# Patient Record
Sex: MALE | Race: WHITE | Employment: FULL TIME | URBAN - METROPOLITAN AREA
[De-identification: names, ages, dates, MRNs, and addresses within clinical notes are randomized per-mention and may not be internally consistent; named-entity substitution may affect disease eponyms.]

---

## 2023-04-07 ENCOUNTER — OFFICE VISIT (OUTPATIENT)
Dept: PRIMARY CARE | Facility: CLINIC | Age: 68
End: 2023-04-07
Payer: COMMERCIAL

## 2023-04-07 VITALS
DIASTOLIC BLOOD PRESSURE: 74 MMHG | WEIGHT: 203 LBS | SYSTOLIC BLOOD PRESSURE: 120 MMHG | TEMPERATURE: 97.2 F | BODY MASS INDEX: 28.31 KG/M2

## 2023-04-07 DIAGNOSIS — B36.9 FUNGAL INFECTION OF SKIN: Primary | ICD-10-CM

## 2023-04-07 PROBLEM — H40.059 BORDERLINE GLAUCOMA WITH OCULAR HYPERTENSION: Status: ACTIVE | Noted: 2023-04-07

## 2023-04-07 PROBLEM — M54.30 SCIATICA: Status: ACTIVE | Noted: 2023-04-07

## 2023-04-07 PROBLEM — H52.223 MYOPIA OF BOTH EYES WITH REGULAR ASTIGMATISM: Status: ACTIVE | Noted: 2023-04-07

## 2023-04-07 PROBLEM — D72.819 LEUKOPENIA: Status: ACTIVE | Noted: 2023-04-07

## 2023-04-07 PROBLEM — Z04.9 CONDITION NOT FOUND: Status: ACTIVE | Noted: 2023-04-07

## 2023-04-07 PROBLEM — H25.13 CATARACT, NUCLEAR SCLEROTIC, BOTH EYES: Status: ACTIVE | Noted: 2023-04-07

## 2023-04-07 PROBLEM — H40.009 BORDERLINE GLAUCOMA: Status: ACTIVE | Noted: 2023-04-07

## 2023-04-07 PROBLEM — H52.13 BILATERAL MYOPIA: Status: ACTIVE | Noted: 2023-04-07

## 2023-04-07 PROBLEM — M54.40 LOW BACK PAIN WITH SCIATICA: Status: ACTIVE | Noted: 2023-04-07

## 2023-04-07 PROBLEM — K63.5 COLON POLYPS: Status: ACTIVE | Noted: 2023-04-07

## 2023-04-07 PROBLEM — R97.20 INCREASED PROSTATE SPECIFIC ANTIGEN (PSA) VELOCITY: Status: ACTIVE | Noted: 2023-04-07

## 2023-04-07 PROBLEM — H52.4 MYOPIA WITH PRESBYOPIA OF BOTH EYES: Status: ACTIVE | Noted: 2023-04-07

## 2023-04-07 PROBLEM — R97.20 ELEVATED PSA: Status: ACTIVE | Noted: 2023-04-07

## 2023-04-07 PROBLEM — K64.8 BLEEDING INTERNAL HEMORRHOIDS: Status: ACTIVE | Noted: 2023-04-07

## 2023-04-07 PROBLEM — D64.9 ANEMIA: Status: ACTIVE | Noted: 2023-04-07

## 2023-04-07 PROBLEM — R60.0 EDEMA OF LEFT LOWER EXTREMITY: Status: ACTIVE | Noted: 2023-04-07

## 2023-04-07 PROBLEM — H40.013 OPEN ANGLE WITH BORDERLINE FINDINGS AND LOW GLAUCOMA RISK IN BOTH EYES: Status: ACTIVE | Noted: 2023-04-07

## 2023-04-07 PROBLEM — H52.13 MYOPIA OF BOTH EYES WITH REGULAR ASTIGMATISM: Status: ACTIVE | Noted: 2023-04-07

## 2023-04-07 PROBLEM — H52.13 MYOPIA WITH PRESBYOPIA OF BOTH EYES: Status: ACTIVE | Noted: 2023-04-07

## 2023-04-07 PROCEDURE — 1036F TOBACCO NON-USER: CPT | Performed by: INTERNAL MEDICINE

## 2023-04-07 PROCEDURE — 99213 OFFICE O/P EST LOW 20 MIN: CPT | Performed by: INTERNAL MEDICINE

## 2023-04-07 PROCEDURE — 1159F MED LIST DOCD IN RCRD: CPT | Performed by: INTERNAL MEDICINE

## 2023-04-07 RX ORDER — CLOTRIMAZOLE 1 %
CREAM (GRAM) TOPICAL 2 TIMES DAILY
Qty: 30 G | Refills: 0 | Status: SHIPPED | OUTPATIENT
Start: 2023-04-07 | End: 2023-05-07

## 2023-04-07 RX ORDER — TIMOLOL MALEATE 5 MG/ML
1 SOLUTION/ DROPS OPHTHALMIC DAILY
COMMUNITY
Start: 2022-02-14

## 2023-04-07 RX ORDER — LATANOPROST 50 UG/ML
1 SOLUTION/ DROPS OPHTHALMIC NIGHTLY
COMMUNITY
Start: 2021-12-30 | End: 2024-03-11

## 2023-04-07 ASSESSMENT — ENCOUNTER SYMPTOMS
MUSCULOSKELETAL NEGATIVE: 1
CARDIOVASCULAR NEGATIVE: 1
NEUROLOGICAL NEGATIVE: 1
PSYCHIATRIC NEGATIVE: 1
HEMATOLOGIC/LYMPHATIC NEGATIVE: 1
ALLERGIC/IMMUNOLOGIC NEGATIVE: 1
RESPIRATORY NEGATIVE: 1
CONSTITUTIONAL NEGATIVE: 1
ENDOCRINE NEGATIVE: 1
GASTROINTESTINAL NEGATIVE: 1
EYES NEGATIVE: 1

## 2023-04-07 ASSESSMENT — VISUAL ACUITY: OU: 1

## 2023-04-07 NOTE — PROGRESS NOTES
Subjective   Patient ID: [id number]   [facility]    Antonio Collins is a 67 y.o. male who presents for Rash (On groin).  Having itching over the groin for few weeks, specially at night.      Objective   Visit Vitals  /74 (BP Location: Left arm, Patient Position: Sitting, BP Cuff Size: Adult)   Temp 36.2 °C (97.2 °F) (Skin)      Review of Systems   Constitutional: Negative.    HENT: Negative.     Eyes: Negative.    Respiratory: Negative.     Cardiovascular: Negative.    Gastrointestinal: Negative.    Endocrine: Negative.    Genitourinary: Negative.    Musculoskeletal: Negative.    Skin:  Positive for rash.        And itching over the groin area.   Allergic/Immunologic: Negative.    Neurological: Negative.    Hematological: Negative.    Psychiatric/Behavioral: Negative.       Physical Exam  Constitutional:       Appearance: Normal appearance. He is well-developed and normal weight.   HENT:      Head: Normocephalic and atraumatic.      Right Ear: Tympanic membrane and ear canal normal.      Left Ear: Tympanic membrane and ear canal normal.      Mouth/Throat:      Mouth: Mucous membranes are moist.      Pharynx: Oropharynx is clear.   Eyes:      General: Lids are normal. Lids are everted, no foreign bodies appreciated. Vision grossly intact.      Extraocular Movements: Extraocular movements intact.      Pupils: Pupils are equal, round, and reactive to light.   Neck:      Thyroid: No thyroid mass or thyroid tenderness.      Trachea: Trachea and phonation normal.   Cardiovascular:      Rate and Rhythm: Normal rate and regular rhythm.      Pulses:           Carotid pulses are 1+ on the right side and 1+ on the left side.       Radial pulses are 1+ on the right side and 1+ on the left side.        Femoral pulses are 1+ on the right side and 1+ on the left side.       Popliteal pulses are 1+ on the right side.      Heart sounds: Normal heart sounds, S1 normal and S2 normal.      No S3 sounds.   Pulmonary:      Effort:  Pulmonary effort is normal.      Breath sounds: Normal breath sounds and air entry.   Abdominal:      General: Abdomen is flat. Bowel sounds are normal. There is no distension.      Palpations: Abdomen is soft. There is no mass.      Tenderness: There is no abdominal tenderness.   Musculoskeletal:         General: No swelling. Normal range of motion.      Cervical back: Normal range of motion and neck supple. No edema.      Right lower leg: No edema.   Lymphadenopathy:      Cervical: No cervical adenopathy.      Right cervical: No superficial cervical adenopathy.     Left cervical: No superficial cervical adenopathy.   Skin:     General: Skin is warm and dry.      Comments: Redness  over the groin area, no oozing.   Neurological:      General: No focal deficit present.      Mental Status: He is alert. Mental status is at baseline.   Psychiatric:         Attention and Perception: Attention normal.         Mood and Affect: Mood normal.         Behavior: Behavior is cooperative.         Assessment/Plan   Problem List Items Addressed This Visit    None  Visit Diagnoses       Fungal infection of skin    -  Primary    Relevant Medications    clotrimazole (Lotrimin) 1 % cream            Nick Escobedo MD

## 2023-05-12 LAB
PROSTATE SPECIFIC AG (NG/ML) IN SER/PLAS: 2.1 NG/ML (ref 0–4)
PROSTATE SPECIFIC AG FREE (NG/ML) IN SER/PLAS: 0.4 NG/ML
PROSTATE SPECIFIC AG FREE/PROSTATE SPECIFIC AG TOTAL IN SER/PLAS: 19 %

## 2023-11-13 ENCOUNTER — APPOINTMENT (OUTPATIENT)
Dept: PRIMARY CARE | Facility: CLINIC | Age: 68
End: 2023-11-13
Payer: COMMERCIAL

## 2023-11-21 ENCOUNTER — OFFICE VISIT (OUTPATIENT)
Dept: PRIMARY CARE | Facility: CLINIC | Age: 68
End: 2023-11-21
Payer: COMMERCIAL

## 2023-11-21 DIAGNOSIS — Z71.9 ENCOUNTER FOR COUNSELING: Primary | ICD-10-CM

## 2023-11-21 DIAGNOSIS — Z00.00 WELLNESS EXAMINATION: ICD-10-CM

## 2023-11-21 PROBLEM — E55.9 VITAMIN D DEFICIENCY: Status: ACTIVE | Noted: 2023-11-21

## 2023-11-21 PROBLEM — M72.0 DUPUYTREN'S CONTRACTURE OF BOTH HANDS: Status: ACTIVE | Noted: 2023-11-21

## 2023-11-21 PROBLEM — L85.3 XEROSIS CUTIS: Status: ACTIVE | Noted: 2023-05-16

## 2023-11-21 PROBLEM — L57.0 ACTINIC KERATOSIS: Status: ACTIVE | Noted: 2023-05-16

## 2023-11-21 PROCEDURE — 1036F TOBACCO NON-USER: CPT | Performed by: INTERNAL MEDICINE

## 2023-11-21 PROCEDURE — 1126F AMNT PAIN NOTED NONE PRSNT: CPT | Performed by: INTERNAL MEDICINE

## 2023-11-21 PROCEDURE — 1159F MED LIST DOCD IN RCRD: CPT | Performed by: INTERNAL MEDICINE

## 2023-11-21 PROCEDURE — UHSMG PR UH SELECT MEET AND GREET: Performed by: INTERNAL MEDICINE

## 2023-11-21 NOTE — PROGRESS NOTES
Patient presents for meet and teja    Born in [place]  Graduate of [employer] for undergrad and [employer] for law school  , previously with [employer].  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    Fly Loera MD

## 2023-11-22 ENCOUNTER — APPOINTMENT (OUTPATIENT)
Dept: PRIMARY CARE | Facility: CLINIC | Age: 68
End: 2023-11-22
Payer: COMMERCIAL

## 2023-11-27 ENCOUNTER — LAB (OUTPATIENT)
Dept: LAB | Facility: LAB | Age: 68
End: 2023-11-27
Payer: COMMERCIAL

## 2023-11-27 DIAGNOSIS — Z00.00 WELLNESS EXAMINATION: ICD-10-CM

## 2023-11-27 LAB
25(OH)D3 SERPL-MCNC: 35 NG/ML (ref 30–100)
ALBUMIN SERPL BCP-MCNC: 4.1 G/DL (ref 3.4–5)
ALP SERPL-CCNC: 57 U/L (ref 33–136)
ALT SERPL W P-5'-P-CCNC: 22 U/L (ref 10–52)
ANION GAP SERPL CALC-SCNC: 9 MMOL/L (ref 10–20)
APPEARANCE UR: ABNORMAL
AST SERPL W P-5'-P-CCNC: 40 U/L (ref 9–39)
BASOPHILS # BLD AUTO: 0.05 X10*3/UL (ref 0–0.1)
BASOPHILS NFR BLD AUTO: 1 %
BILIRUB SERPL-MCNC: 0.6 MG/DL (ref 0–1.2)
BILIRUB UR STRIP.AUTO-MCNC: NEGATIVE MG/DL
BUN SERPL-MCNC: 16 MG/DL (ref 6–23)
CALCIUM SERPL-MCNC: 8.5 MG/DL (ref 8.6–10.3)
CAOX CRY #/AREA UR COMP ASSIST: ABNORMAL /HPF
CHLORIDE SERPL-SCNC: 105 MMOL/L (ref 98–107)
CHOLEST SERPL-MCNC: 207 MG/DL (ref 0–199)
CHOLESTEROL/HDL RATIO: 3
CO2 SERPL-SCNC: 28 MMOL/L (ref 21–32)
COLOR UR: ABNORMAL
CREAT SERPL-MCNC: 0.79 MG/DL (ref 0.5–1.3)
CRP SERPL HS-MCNC: 0.8 MG/L
EOSINOPHIL # BLD AUTO: 0.32 X10*3/UL (ref 0–0.7)
EOSINOPHIL NFR BLD AUTO: 6.2 %
ERYTHROCYTE [DISTWIDTH] IN BLOOD BY AUTOMATED COUNT: 13.2 % (ref 11.5–14.5)
GFR SERPL CREATININE-BSD FRML MDRD: >90 ML/MIN/1.73M*2
GLUCOSE SERPL-MCNC: 100 MG/DL (ref 74–99)
GLUCOSE UR STRIP.AUTO-MCNC: NEGATIVE MG/DL
HCT VFR BLD AUTO: 39.7 % (ref 41–52)
HDLC SERPL-MCNC: 68.2 MG/DL
HGB BLD-MCNC: 13 G/DL (ref 13.5–17.5)
IMM GRANULOCYTES # BLD AUTO: 0.01 X10*3/UL (ref 0–0.7)
IMM GRANULOCYTES NFR BLD AUTO: 0.2 % (ref 0–0.9)
KETONES UR STRIP.AUTO-MCNC: NEGATIVE MG/DL
LDLC SERPL CALC-MCNC: 125 MG/DL
LEUKOCYTE ESTERASE UR QL STRIP.AUTO: NEGATIVE
LYMPHOCYTES # BLD AUTO: 1.59 X10*3/UL (ref 1.2–4.8)
LYMPHOCYTES NFR BLD AUTO: 30.6 %
MCH RBC QN AUTO: 33 PG (ref 26–34)
MCHC RBC AUTO-ENTMCNC: 32.7 G/DL (ref 32–36)
MCV RBC AUTO: 101 FL (ref 80–100)
MONOCYTES # BLD AUTO: 0.57 X10*3/UL (ref 0.1–1)
MONOCYTES NFR BLD AUTO: 11 %
MUCOUS THREADS #/AREA URNS AUTO: ABNORMAL /LPF
NEUTROPHILS # BLD AUTO: 2.65 X10*3/UL (ref 1.2–7.7)
NEUTROPHILS NFR BLD AUTO: 51 %
NITRITE UR QL STRIP.AUTO: NEGATIVE
NON HDL CHOLESTEROL: 139 MG/DL (ref 0–149)
NRBC BLD-RTO: 0 /100 WBCS (ref 0–0)
PH UR STRIP.AUTO: 5 [PH]
PLATELET # BLD AUTO: 217 X10*3/UL (ref 150–450)
POTASSIUM SERPL-SCNC: 4.1 MMOL/L (ref 3.5–5.3)
PROT SERPL-MCNC: 6.5 G/DL (ref 6.4–8.2)
PROT UR STRIP.AUTO-MCNC: NEGATIVE MG/DL
PSA SERPL-MCNC: 3.2 NG/ML
RBC # BLD AUTO: 3.94 X10*6/UL (ref 4.5–5.9)
RBC # UR STRIP.AUTO: ABNORMAL /UL
RBC #/AREA URNS AUTO: >20 /HPF
SODIUM SERPL-SCNC: 138 MMOL/L (ref 136–145)
SP GR UR STRIP.AUTO: 1.03
TRIGL SERPL-MCNC: 70 MG/DL (ref 0–149)
TSH SERPL-ACNC: 1.37 MIU/L (ref 0.44–3.98)
UROBILINOGEN UR STRIP.AUTO-MCNC: 2 MG/DL
VLDL: 14 MG/DL (ref 0–40)
WBC # BLD AUTO: 5.2 X10*3/UL (ref 4.4–11.3)
WBC #/AREA URNS AUTO: ABNORMAL /HPF

## 2023-11-27 PROCEDURE — 36415 COLL VENOUS BLD VENIPUNCTURE: CPT

## 2023-11-27 PROCEDURE — 80053 COMPREHEN METABOLIC PANEL: CPT

## 2023-11-27 PROCEDURE — 86141 C-REACTIVE PROTEIN HS: CPT

## 2023-11-27 PROCEDURE — 84443 ASSAY THYROID STIM HORMONE: CPT

## 2023-11-27 PROCEDURE — 85025 COMPLETE CBC W/AUTO DIFF WBC: CPT

## 2023-11-27 PROCEDURE — 83036 HEMOGLOBIN GLYCOSYLATED A1C: CPT

## 2023-11-27 PROCEDURE — 84153 ASSAY OF PSA TOTAL: CPT

## 2023-11-27 PROCEDURE — 80061 LIPID PANEL: CPT

## 2023-11-27 PROCEDURE — 81001 URINALYSIS AUTO W/SCOPE: CPT

## 2023-11-27 PROCEDURE — 82306 VITAMIN D 25 HYDROXY: CPT

## 2023-11-28 LAB
EST. AVERAGE GLUCOSE BLD GHB EST-MCNC: 120 MG/DL
HBA1C MFR BLD: 5.8 %

## 2023-12-04 ENCOUNTER — TELEMEDICINE (OUTPATIENT)
Dept: PRIMARY CARE | Facility: CLINIC | Age: 68
End: 2023-12-04
Payer: COMMERCIAL

## 2023-12-04 ENCOUNTER — TELEPHONE (OUTPATIENT)
Dept: PRIMARY CARE | Facility: CLINIC | Age: 68
End: 2023-12-04

## 2023-12-04 DIAGNOSIS — R73.01 IMPAIRED FASTING GLUCOSE: ICD-10-CM

## 2023-12-04 DIAGNOSIS — R31.29 MICROSCOPIC HEMATURIA: ICD-10-CM

## 2023-12-04 DIAGNOSIS — Z91.89 AT INCREASED RISK FOR CARDIOVASCULAR DISEASE: ICD-10-CM

## 2023-12-04 DIAGNOSIS — D53.9 MACROCYTIC ANEMIA: ICD-10-CM

## 2023-12-04 DIAGNOSIS — U07.1 COVID-19 VIRUS INFECTION: Primary | ICD-10-CM

## 2023-12-04 DIAGNOSIS — R97.20 ELEVATED PSA: ICD-10-CM

## 2023-12-04 PROBLEM — D72.819 LEUKOPENIA: Status: RESOLVED | Noted: 2023-04-07 | Resolved: 2023-12-04

## 2023-12-04 PROCEDURE — 99423 OL DIG E/M SVC 21+ MIN: CPT | Performed by: INTERNAL MEDICINE

## 2023-12-04 ASSESSMENT — ENCOUNTER SYMPTOMS
COUGH: 1
FEVER: 0
DIZZINESS: 0
BACK PAIN: 0
FATIGUE: 1
SHORTNESS OF BREATH: 0
HEADACHES: 0
SORE THROAT: 0
CHILLS: 1

## 2023-12-04 NOTE — PROGRESS NOTES
Subjective   Patient ID: Antonio Collins is a 68 y.o. male who presents for Covid-19 Home Monitoring Phone Call and Results.    Patient presents today for virtual visit due to COVID-19 virus infection  Patient consents to virtual visit.  He is currently located in [place].  I am currently located in [place].    COVID-19 virus infection  Symptom onset on 12/3.  Symptoms have included nasal congestion, chills, mild cough.  No fever.  No shortness of breath.  Patient has not taken any treatments.  He has been vaccinated.  He is actually feeling better currently.  Self-isolation guidelines reviewed    Intermediate cardiovascular risk (10-year cardiovascular risk = 10.6%)  Lipid panel reviewed.  10-year cardiovascular risk calculated at 10.6%.  Lifestyle measures reviewed including diet.  Patient maintains fairly well-balanced diet with limited intake of eggs, dairy, and red meat.  He generally exercises 3-4 times a week.  He denies any current cardiovascular symptoms.  Additional risk stratification using CT calcium scoring discussed which patient would like to pursue.    Macrocytic anemia  Current CBC with stable red blood cell count and MCV.  1/2023, CBC at similar level.  At that time, B12/folic acid/iron stores tested and normal.  Results reviewed with patient.  Plan to repeat labs in 6 months    Impaired fasting glucose (high normal glucose at 100, mildly elevated hemoglobin A1c at 5.8%)  No previous identification of elevated blood sugar or hemoglobin A1c.  Lifestyle measures reviewed and patient is keeping active and follows well-balanced diet.  Weight 199 pounds, height 6 feet 0 inches.    Microscopic hematuria  Patient denies any gross hematuria, dysuria, flank pain, abdominal pain, nausea or vomiting.  No history of kidney stone/nephrolithiasis  No previous evaluation for this condition.  He sees Dr. Pan as his urologist    Elevated PSA  MRI of prostate normal.  Negative prostate biopsy 5/2023.  Patient is  scheduled to see Dr. Pan in follow-up (to be delayed due to COVID-19)            Review of Systems   Constitutional:  Positive for chills and fatigue. Negative for fever.   HENT:  Positive for congestion. Negative for sore throat.    Respiratory:  Positive for cough. Negative for shortness of breath.    Cardiovascular:  Negative for chest pain.   Musculoskeletal:  Negative for back pain.   Neurological:  Negative for dizziness and headaches.       Objective   There were no vitals taken for this visit.    Physical Exam  Constitutional:       Appearance: Normal appearance.   HENT:      Mouth/Throat:      Mouth: Mucous membranes are moist.   Cardiovascular:      Comments: Patient is speaking in full sentences, appears comfortable and not in distress  Neurological:      Mental Status: He is alert.         Assessment/Plan     COVID-19 virus infection  Symptomatic treatment recommended:  Robitussin DM or Mucinex DM twice a day as needed for cough  Adequate fluid intake  Over-the-counter saline nasal spray (2 sprays in each nostril every 2-3 hours as needed/desired) and/or Flonase nasal spray (2 sprays in each nostril at bedtime)  CDC self-isolation guidelines discussed  Patient contact office if any worsening or unresolved symptoms    Intermediate cardiovascular risk (10-year cardiovascular risk = 10.6%)  Lifestyle measures including regular exercise and low-fat/cholesterol, high-fiber diet recommended  CT calcium scoring ordered for additional restratification    Macrocytic anemia (stable compared to 1/2023, normal B12-iron stores-folic acid at that time)  Monitor expectantly, repeat CBC in May    Impaired fasting glucose (high normal glucose at 100, mildly elevated hemoglobin A1c at 5.8%)  Lifestyle measures including low-carb/sugar diet, regular aerobic exercise, and modest weight loss of 5-10 pounds recommended  Repeat BMP and hemoglobin A1c in May    Microscopic hematuria  Referral to urology, Dr. Pan, for  evaluation    Elevated PSA (with negative prostate biopsy 5/2023)  Continue follow-up with urology, Dr. Pan    Follow-up  Wellness exam/physical in 1 to 2 months    (Virtual visit time = 33 minutes)    Fly Loera MD

## 2023-12-04 NOTE — TELEPHONE ENCOUNTER
Patient tested positive for covid 19 last night.  He would like to discuss abnormal labs.  Should I change his appointment to a virtual visit? Should I reschedule his CPE?  What do you recommend?

## 2023-12-04 NOTE — Clinical Note
Sal, I have recently assume primary care for our mutual patient, Antonio. Unfortunately, he has COVID-19 and had to cancel his in-person wellness exam/physical with me today, so we completed virtual visit. He will reschedule his appointment for later this week with you regarding his elevated PSA. He also has microscopic hematuria (greater than 20 RBC per HPF) without symptoms.  I have recommended that he review with you for your recommendations regarding evaluation. Thank you and Happy Holidays! Gokul

## 2023-12-04 NOTE — PATIENT INSTRUCTIONS
COVID-19 virus infection  Symptomatic treatment recommended:  Robitussin DM or Mucinex DM twice a day as needed for cough  Adequate fluid intake  Over-the-counter saline nasal spray (2 sprays in each nostril every 2-3 hours as needed/desired) and/or Flonase nasal spray (2 sprays in each nostril at bedtime)  CDC self-isolation guidelines discussed  Patient contact office if any worsening or unresolved symptoms    Intermediate cardiovascular risk (10-year cardiovascular risk = 10.6%)  Lifestyle measures including regular exercise and low-fat/cholesterol, high-fiber diet recommended  CT calcium scoring ordered for additional restratification    Macrocytic anemia (stable compared to 1/2023, normal B12-iron stores-folic acid at that time)  Monitor expectantly, repeat CBC in May    Impaired fasting glucose (high normal glucose at 100, mildly elevated hemoglobin A1c at 5.8%)  Lifestyle measures including low-carb/sugar diet, regular aerobic exercise, and modest weight loss of 5-10 pounds recommended  Repeat BMP and hemoglobin A1c in May    Microscopic hematuria  Referral to urology, Dr. Pan, for evaluation    Elevated PSA (with negative prostate biopsy 5/2023)  Continue follow-up with urology, Dr. Pan    Follow-up  Wellness exam/physical in 1 to 2 months

## 2023-12-05 ENCOUNTER — TELEPHONE (OUTPATIENT)
Dept: UROLOGY | Facility: HOSPITAL | Age: 68
End: 2023-12-05
Payer: COMMERCIAL

## 2023-12-05 DIAGNOSIS — R31.29 OTHER MICROSCOPIC HEMATURIA: ICD-10-CM

## 2023-12-05 NOTE — TELEPHONE ENCOUNTER
Returned patient's call and let him know that Dr. Pan wants a CT Urogram, urine culture, and urine cytology prior to his appointment. Left number for scheduling CT U. [employer] message will be sent.    Adali Branham RN

## 2023-12-07 ENCOUNTER — APPOINTMENT (OUTPATIENT)
Dept: UROLOGY | Facility: HOSPITAL | Age: 68
End: 2023-12-07
Payer: COMMERCIAL

## 2024-01-02 DIAGNOSIS — R31.29 MICROSCOPIC HEMATURIA: ICD-10-CM

## 2024-01-04 ENCOUNTER — LAB (OUTPATIENT)
Dept: LAB | Facility: LAB | Age: 69
End: 2024-01-04
Payer: MEDICARE

## 2024-01-04 DIAGNOSIS — R31.29 MICROSCOPIC HEMATURIA: ICD-10-CM

## 2024-01-04 DIAGNOSIS — R31.29 OTHER MICROSCOPIC HEMATURIA: ICD-10-CM

## 2024-01-04 PROCEDURE — 88112 CYTOPATH CELL ENHANCE TECH: CPT | Performed by: PATHOLOGY

## 2024-01-04 PROCEDURE — 88112 CYTOPATH CELL ENHANCE TECH: CPT

## 2024-01-04 PROCEDURE — 87086 URINE CULTURE/COLONY COUNT: CPT

## 2024-01-05 ENCOUNTER — ANCILLARY PROCEDURE (OUTPATIENT)
Dept: RADIOLOGY | Facility: CLINIC | Age: 69
End: 2024-01-05
Payer: COMMERCIAL

## 2024-01-05 DIAGNOSIS — R31.29 OTHER MICROSCOPIC HEMATURIA: ICD-10-CM

## 2024-01-05 PROCEDURE — 2550000001 HC RX 255 CONTRASTS: Performed by: UROLOGY

## 2024-01-05 PROCEDURE — 76377 3D RENDER W/INTRP POSTPROCES: CPT

## 2024-01-05 PROCEDURE — 74178 CT ABD&PLV WO CNTR FLWD CNTR: CPT | Performed by: STUDENT IN AN ORGANIZED HEALTH CARE EDUCATION/TRAINING PROGRAM

## 2024-01-05 PROCEDURE — 76377 3D RENDER W/INTRP POSTPROCES: CPT | Performed by: STUDENT IN AN ORGANIZED HEALTH CARE EDUCATION/TRAINING PROGRAM

## 2024-01-05 RX ADMIN — IOHEXOL 67 ML: 350 INJECTION, SOLUTION INTRAVENOUS at 08:53

## 2024-01-06 LAB — BACTERIA UR CULT: NO GROWTH

## 2024-01-08 LAB
LABORATORY COMMENT REPORT: NORMAL
LABORATORY COMMENT REPORT: NORMAL
PATH REPORT.FINAL DX SPEC: NORMAL
PATH REPORT.GROSS SPEC: NORMAL
PATH REPORT.RELEVANT HX SPEC: NORMAL
PATH REPORT.TOTAL CANCER: NORMAL

## 2024-01-09 ENCOUNTER — APPOINTMENT (OUTPATIENT)
Dept: RADIOLOGY | Facility: CLINIC | Age: 69
End: 2024-01-09
Payer: MEDICARE

## 2024-01-10 ENCOUNTER — APPOINTMENT (OUTPATIENT)
Dept: PRIMARY CARE | Facility: CLINIC | Age: 69
End: 2024-01-10
Payer: COMMERCIAL

## 2024-01-16 NOTE — PROGRESS NOTES
"FUV    Last seen - 5/26/23     HISTORY OF PRESENT ILLNESS:   Antonio Collins is a 68 y.o. male who is being seen today for cystoscopy and to review PSA results.     FHx of prostate cancer with father and 3 uncles all dx with prostate cancer. He denies any hx of breast cancer in family.    PAST MEDICAL HISTORY:  Past Medical History:   Diagnosis Date    Other specified health status 05/13/2019    No pertinent past medical history    Polyp of vocal cord and larynx     Polyp, vocal cord   - Elevated PSA    PAST SURGICAL HISTORY:  Past Surgical History:   Procedure Laterality Date    OTHER SURGICAL HISTORY  07/21/2020    Cordectomy of vocal cord    OTHER SURGICAL HISTORY  03/29/2020    Elbow fracture repair    OTHER SURGICAL HISTORY  03/29/2020    Varicocelectomy        ALLERGIES:   No Known Allergies     MEDICATIONS:   Current Outpatient Medications   Medication Instructions    cetirizine HCl (ZYRTEC ORAL) oral, Allergy Tabs for Family history of malignant neoplasm of prostate    latanoprost (Xalatan) 0.005 % ophthalmic solution 1 drop, Both Eyes, Nightly, For open angle with borderline findings and low glaucoma risk in both eyes    NON FORMULARY DOCAL 65-60 MG Caps, take as directed    NON FORMULARY Hydrocortisone 20 mg/Lidociane 2% suppository    timolol (Timoptic) 0.5 % ophthalmic solution 1 drop, Both Eyes, Daily, For open angle with borderline findings and low glaucoma risk in both eyes        PHYSICAL EXAM:  There were no vitals taken for this visit.  Constitutional: Patient appears well-developed and well-nourished. No distress.    Pulmonary/Chest: Effort normal. No respiratory distress.   Abdominal: Soft, ND NT  : WNL  Musculoskeletal: Normal range of motion.    Neurological: Alert and oriented to person, place, and time.  Psychiatric: Normal mood and affect. Behavior is normal. Thought content normal.      Labs:  No results found for: \"TESTOSTERONE\"  Lab Results   Component Value Date    PSA 2.1 05/09/2023 " "  Most recent PSA was 3.2 on 11/27/23.    No components found for: \"CBC\"  Lab Results   Component Value Date    CREATININE 0.79 11/27/2023     No components found for: \"TESTOTMS\"  No results found for: \"TESTF\"  Imagining:   - CT Urogram on 1/5/24 resulted negative.  - Results reviewed with patient. Patient reassured.     Discussion:  Denies any dysuria, hematuria, bothersome urinary frequency, urgency, or flank pain. The cystoscopy was completed today due to microscopic hematuria and demonstrated an enlarged prostate, no tumors, stones or lesions. Cytology sent. 1 abx given to patient in clinic today. Patient instructed to follow up in 6 months for urine check, pt agrees with plan.      Assessment:      1. Microscopic hematuria  Cystoscopy          Antonio Collins is a 68 y.o. male here for FUV     Plan:   1) Follow up in 6 months for urine check.  2) All questions and concerns were addressed. Patient verbalizes understanding and has no other questions at this time.     Scribe Attestation  By signing my name below, IEcho Scribe   attest that this documentation has been prepared under the direction and in the presence of Sal Pan MD.    "

## 2024-01-18 ENCOUNTER — PROCEDURE VISIT (OUTPATIENT)
Dept: UROLOGY | Facility: HOSPITAL | Age: 69
End: 2024-01-18
Payer: MEDICARE

## 2024-01-18 DIAGNOSIS — R31.29 MICROSCOPIC HEMATURIA: Primary | ICD-10-CM

## 2024-01-18 DIAGNOSIS — Z79.2 PROPHYLACTIC ANTIBIOTIC: ICD-10-CM

## 2024-01-18 LAB
POC APPEARANCE, URINE: CLEAR
POC BILIRUBIN, URINE: NEGATIVE
POC BLOOD, URINE: NEGATIVE
POC COLOR, URINE: YELLOW
POC GLUCOSE, URINE: NEGATIVE MG/DL
POC KETONES, URINE: NEGATIVE MG/DL
POC LEUKOCYTES, URINE: NEGATIVE
POC NITRITE,URINE: NEGATIVE
POC PH, URINE: 7 PH
POC PROTEIN, URINE: NEGATIVE MG/DL
POC SPECIFIC GRAVITY, URINE: 1.01
POC UROBILINOGEN, URINE: 0.2 EU/DL

## 2024-01-18 PROCEDURE — 99213 OFFICE O/P EST LOW 20 MIN: CPT | Performed by: UROLOGY

## 2024-01-18 PROCEDURE — 52000 CYSTOURETHROSCOPY: CPT | Performed by: UROLOGY

## 2024-01-18 PROCEDURE — 81003 URINALYSIS AUTO W/O SCOPE: CPT | Performed by: UROLOGY

## 2024-01-18 RX ORDER — CIPROFLOXACIN 500 MG/1
500 TABLET ORAL ONCE
Status: DISCONTINUED | OUTPATIENT
Start: 2024-01-18 | End: 2024-03-22 | Stop reason: ALTCHOICE

## 2024-01-19 ENCOUNTER — APPOINTMENT (OUTPATIENT)
Dept: RADIOLOGY | Facility: CLINIC | Age: 69
End: 2024-01-19
Payer: MEDICARE

## 2024-01-23 ENCOUNTER — OFFICE VISIT (OUTPATIENT)
Dept: DERMATOLOGY | Facility: CLINIC | Age: 69
End: 2024-01-23
Payer: MEDICARE

## 2024-01-23 DIAGNOSIS — R21 RASH AND OTHER NONSPECIFIC SKIN ERUPTION: Primary | ICD-10-CM

## 2024-01-23 DIAGNOSIS — L30.9 DERMATITIS, UNSPECIFIED: ICD-10-CM

## 2024-01-23 PROCEDURE — 1159F MED LIST DOCD IN RCRD: CPT | Performed by: NURSE PRACTITIONER

## 2024-01-23 PROCEDURE — 99214 OFFICE O/P EST MOD 30 MIN: CPT | Performed by: NURSE PRACTITIONER

## 2024-01-23 PROCEDURE — 1036F TOBACCO NON-USER: CPT | Performed by: NURSE PRACTITIONER

## 2024-01-23 PROCEDURE — 1126F AMNT PAIN NOTED NONE PRSNT: CPT | Performed by: NURSE PRACTITIONER

## 2024-01-23 RX ORDER — FLUOCINONIDE 0.5 MG/G
CREAM TOPICAL
Qty: 60 G | Refills: 3 | Status: SHIPPED | OUTPATIENT
Start: 2024-01-23

## 2024-01-23 RX ORDER — TRIAMCINOLONE ACETONIDE 1 MG/G
CREAM TOPICAL
Qty: 453 G | Refills: 3 | Status: SHIPPED | OUTPATIENT
Start: 2024-01-23 | End: 2024-04-08 | Stop reason: ALTCHOICE

## 2024-01-24 NOTE — PROGRESS NOTES
"Subjective     Antonio Collins is a 68 y.o. male who presents for the following: Rash (\"Rash\" to neck and back. Using triamcinolone, fluocinonide and ketoconazole to treat. No further complaints.).     Review of Systems:  No other skin or systemic complaints other than what is documented elsewhere in the note.    The following portions of the chart were reviewed this encounter and updated as appropriate:  Tobacco  Allergies  Meds  Problems  Med Hx  Surg Hx  Fam Hx         Skin Cancer History  No skin cancer on file.      Specialty Problems          Dermatology Problems    Fungal infection of skin    Actinic keratosis    Xerosis cutis        Objective   Well appearing patient in no apparent distress; mood and affect are within normal limits.    A full examination was performed including scalp, head, eyes, ears, nose, lips, neck, chest, axillae, abdomen, back, buttocks, bilateral upper extremities, bilateral lower extremities, hands, feet, fingers, toes, fingernails, and toenails. All findings within normal limits unless otherwise noted below.    Assessment/Plan   1. Rash and other nonspecific skin eruption    Related Medications  triamcinolone (Kenalog) 0.1 % cream  Twice daily to affected areas for 3-4 weeks, then weekends only. Repeat every few months for flares.    fluocinonide (Lidex) 0.05 % cream  Twice daily to affected areas on neck, chest, and back    2. Dermatitis, unspecified  Neck - Anterior, Neck - Posterior  Mildly erythematous, scaly patches. 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 No trigger identified, worse during the fall/winter months. Has been using Vanicream products, triamcinolone and fluocinonide as needed.     -Discussed nature of diagnosis and treatment options  -When the rash is active, apply topical corticosteroids to the active areas of the rash as prescribed  -Recommend to use liberal emollients twice daily, one time applied immediately after shower while " skin is still slightly damp. Use emollients to all areas of the body that may be affected and use whether the rash is active or not. Use prescription medications before applying emollients.  -Discussed with/information given to the patient on the risks, benefits and alternatives of the usage of topical corticosteroids, including but not limited to: atrophy (thinning of the skin), striae (stretch marks), telangiectasia (blood vessel growth), and dyspigmentation (discoloration of the skin).  -Recommend to limit long-term use of topical corticosteroids to less than 14 days per month to reduce risk of side effects.  -Recommend: Patch testing, will schedule. Keep a trigger diary. Will refill triamcinolone and fluocinonide, use as directed and only as needed.

## 2024-02-05 ENCOUNTER — HOSPITAL ENCOUNTER (OUTPATIENT)
Dept: RADIOLOGY | Facility: CLINIC | Age: 69
Discharge: HOME | End: 2024-02-05
Payer: COMMERCIAL

## 2024-02-05 DIAGNOSIS — Z91.89 AT INCREASED RISK FOR CARDIOVASCULAR DISEASE: ICD-10-CM

## 2024-02-05 PROCEDURE — 75571 CT HRT W/O DYE W/CA TEST: CPT

## 2024-02-07 ENCOUNTER — APPOINTMENT (OUTPATIENT)
Dept: PRIMARY CARE | Facility: CLINIC | Age: 69
End: 2024-02-07
Payer: COMMERCIAL

## 2024-02-13 ENCOUNTER — APPOINTMENT (OUTPATIENT)
Dept: OPHTHALMOLOGY | Facility: CLINIC | Age: 69
End: 2024-02-13
Payer: COMMERCIAL

## 2024-03-11 DIAGNOSIS — H40.013 OPEN ANGLE WITH BORDERLINE FINDINGS, LOW RISK, BILATERAL: ICD-10-CM

## 2024-03-11 RX ORDER — LATANOPROST 50 UG/ML
1 SOLUTION/ DROPS OPHTHALMIC NIGHTLY
Qty: 2.5 ML | Refills: 6 | Status: SHIPPED | OUTPATIENT
Start: 2024-03-11 | End: 2024-05-29 | Stop reason: SDUPTHER

## 2024-03-22 ENCOUNTER — OFFICE VISIT (OUTPATIENT)
Dept: PRIMARY CARE | Facility: CLINIC | Age: 69
End: 2024-03-22
Payer: COMMERCIAL

## 2024-03-22 VITALS
BODY MASS INDEX: 28.14 KG/M2 | HEIGHT: 71 IN | SYSTOLIC BLOOD PRESSURE: 110 MMHG | HEART RATE: 68 BPM | WEIGHT: 201 LBS | DIASTOLIC BLOOD PRESSURE: 62 MMHG | OXYGEN SATURATION: 100 %

## 2024-03-22 DIAGNOSIS — R74.01 ELEVATED AST (SGOT): ICD-10-CM

## 2024-03-22 DIAGNOSIS — R73.01 IMPAIRED FASTING GLUCOSE: ICD-10-CM

## 2024-03-22 DIAGNOSIS — R31.29 MICROSCOPIC HEMATURIA: ICD-10-CM

## 2024-03-22 DIAGNOSIS — D12.6 TUBULAR ADENOMA OF COLON: ICD-10-CM

## 2024-03-22 DIAGNOSIS — R97.20 ELEVATED PSA: ICD-10-CM

## 2024-03-22 DIAGNOSIS — D53.9 MACROCYTIC ANEMIA: ICD-10-CM

## 2024-03-22 DIAGNOSIS — Z00.00 WELLNESS EXAMINATION: Primary | ICD-10-CM

## 2024-03-22 DIAGNOSIS — Z23 NEED FOR VACCINATION: ICD-10-CM

## 2024-03-22 DIAGNOSIS — Z11.59 ENCOUNTER FOR HEPATITIS C SCREENING TEST FOR LOW RISK PATIENT: ICD-10-CM

## 2024-03-22 DIAGNOSIS — L57.0 ACTINIC KERATOSIS: ICD-10-CM

## 2024-03-22 DIAGNOSIS — H91.93 BILATERAL HEARING LOSS, UNSPECIFIED HEARING LOSS TYPE: ICD-10-CM

## 2024-03-22 DIAGNOSIS — L20.84 INTRINSIC ECZEMA: ICD-10-CM

## 2024-03-22 DIAGNOSIS — R60.0 EDEMA OF LEFT LOWER EXTREMITY: ICD-10-CM

## 2024-03-22 DIAGNOSIS — K21.9 GASTROESOPHAGEAL REFLUX DISEASE, UNSPECIFIED WHETHER ESOPHAGITIS PRESENT: ICD-10-CM

## 2024-03-22 DIAGNOSIS — E55.9 VITAMIN D DEFICIENCY: ICD-10-CM

## 2024-03-22 PROBLEM — B36.9 FUNGAL INFECTION OF SKIN: Status: RESOLVED | Noted: 2023-04-07 | Resolved: 2024-03-22

## 2024-03-22 PROCEDURE — 90677 PCV20 VACCINE IM: CPT | Performed by: INTERNAL MEDICINE

## 2024-03-22 PROCEDURE — 1036F TOBACCO NON-USER: CPT | Performed by: INTERNAL MEDICINE

## 2024-03-22 PROCEDURE — UHSPHYS PR UH SELECT PHYSICAL: Performed by: INTERNAL MEDICINE

## 2024-03-22 PROCEDURE — 1160F RVW MEDS BY RX/DR IN RCRD: CPT | Performed by: INTERNAL MEDICINE

## 2024-03-22 PROCEDURE — 90471 IMMUNIZATION ADMIN: CPT | Performed by: INTERNAL MEDICINE

## 2024-03-22 PROCEDURE — 1159F MED LIST DOCD IN RCRD: CPT | Performed by: INTERNAL MEDICINE

## 2024-03-22 ASSESSMENT — ENCOUNTER SYMPTOMS
CONSTIPATION: 0
FATIGUE: 0
HEADACHES: 0
FREQUENCY: 0
SORE THROAT: 0
BACK PAIN: 0
DIARRHEA: 0
WEAKNESS: 0
DEPRESSION: 0
ARTHRALGIAS: 0
DIZZINESS: 0
COUGH: 0
SHORTNESS OF BREATH: 0
DYSURIA: 0
LOSS OF SENSATION IN FEET: 0
OCCASIONAL FEELINGS OF UNSTEADINESS: 0

## 2024-03-22 NOTE — PATIENT INSTRUCTIONS
Wellness exam/physical  Prevnar-20 vaccine given today  Regular exercise with goal of 120-150 minutes per week recommended  Well-balanced diet rich in fruits, vegetables, fiber, lean protein recommended  Continued routine follow-up with dentistry recommended  Referral for [employer] fitness training assessment    GERD/gastroesophageal reflux disease  Proper lifestyle measures recommended including:  Limited intake of caffeine, alcohol, spicy or acidic foods, eating large or late evening meals  Over-the-counter antiacids can be taken as needed including: Rolaids, Tums, or Pepcid  If persistent symptoms, contact office    Impaired fasting glucose (hemoglobin A1c 5.8% in 11/2023)  Overweight  Low carbohydrate/sugar diet, increased exercise, and modest weight loss of 10 pounds recommended    Macrocytic anemia (hematologic workup in 1/2023)  CBC ordered.  Monitor expectantly.    Microscopic hematuria  Normal evaluation recently completed in 1/2024  Continue follow-up with urology    Elevated PSA (with negative prostate biopsy)  Continue follow-up with urology, next appointment with Dr. Pan in July 2024    Chronic left lower extremity edema, possible venous insufficiency  Referral to vascular medicine/surgery for consultation  Low-salt diet and compression socks recommended    Eczema  History of actinic keratoses  Continue fluocinonide 0.05% cream up to twice a day as needed  Continue routine follow-up with dermatology, Dr. Tse    History of colon polyps  Next screening colonoscopy due in 6/2027    Borderline glaucoma  Continue timolol and latanoprost  Continue routine follow-up with ophthalmology, next appointment scheduled in 1 week    Borderline AST elevation  Borderline hypocalcemia  (On 11/2023 labs)  CMP lab work ordered for evaluation    Bilateral hearing loss  Referral to audiology provided    Follow-up  1.  Office visit in September 2024  2.  Wellness exam/physical on/after 3/22/2025

## 2024-03-22 NOTE — PROGRESS NOTES
"Subjective   Patient ID: Antonio Collins is a 68 y.o. male who presents for Annual Exam.    Wellness exam/physical    GERD/gastroesophageal reflux disease  Recent onset over the last 2 months.  Having episodes perhaps 3 times a week.  Evening or nocturnal \"burning sensation\" in mid chest.  Precipitants include wine or other alcohol, tomato-based foods, etc.  No dysphagia.  No treatment taken.  3 cups of coffee per day, 4-5 drinks per week (wine or liquor), occasional tomato-based foods.  Remote evaluation approximately 20 years ago for GERD including EGD and acid probe which patient states were normal.  He does not recall receiving medication or other treatment at that time.    Impaired fasting glucose  Hemoglobin A1c 5.8% in 11/2023  Proper diet reviewed.  Recently, exercise has been more sporadic.    Macrocytic anemia   Hematology consult in 1/2023 which was unremarkable for specific treatable etiology.  Previous CBC in November stable.     Microscopic hematuria  Urinalysis with microscopic hematuria in November led to urology workup with Dr. Pan including CT urogram and cystoscopy in January which was negative.  Follow-up with urology in 6 months, July, recommended    Elevated PSA (with negative prostate biopsy in 2023)  Most recent PSA in November stable.  Next appointment with Dr. Pan in July.  Patient denies any BPH-type symptoms such as hesitancy, nocturia, etc.    Chronic left lower extremity edema  Ultrasound for DVT last year negative.  Option of referral to vascular surgery/medicine discussed which patient would like to pursue.    Eczema  History of actinic keratoses  Recent visit with dermatology, Dr. Tse, who prescribed fluocinonide cream which has been helpful.  Patient states that he has been diagnosed with eczema.    History of colon polyps  Next screening colonoscopy due in 6/2027, Dr. Reich    Borderline glaucoma  Stable on treatment with timolol and latanoprost.  Appointment with " "ophthalmology scheduled next week.    Borderline AST elevation/borderline hypocalcemia (on 11/2023 labs)  Repeat lab work ordered.  Alcohol intake is moderate, only 3-4 drinks per week.    Bilateral hearing loss  Patient states that his wife indicates hearing loss.      Health maintenance  Exercise: Currently sporadic, history of elliptical/rowing/light weight lifting.  Colon cancer screening: Up-to-date  Ophthalmology: Up-to-date  Dermatology: Up-to-date  Dentistry: Up-to-date    Social  Born in [place].  Now lives in [place] with his wife.  2 sons.  Graduate of [employer] for undergrad and [employer] for [employer] school  , previously with S[employer] And[employer].  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   HENT:  Negative for sore throat.    Respiratory:  Negative for cough and shortness of breath.    Cardiovascular:  Positive for leg swelling. Negative for chest pain.   Gastrointestinal:  Negative for constipation and diarrhea.   Genitourinary:  Negative for dysuria, frequency and urgency.   Musculoskeletal:  Negative for arthralgias and back pain.   Skin:  Positive for rash.   Neurological:  Negative for dizziness, weakness and headaches.       Objective   /62 (BP Location: Left arm, Patient Position: Sitting, BP Cuff Size: Adult)   Pulse 68   Ht 1.791 m (5' 10.5\")   Wt 91.2 kg (201 lb)   SpO2 100%   BMI 28.43 kg/m²     Physical Exam  Vitals reviewed.   Constitutional:       Appearance: Normal appearance.   HENT:      Head: Normocephalic.      Right Ear: Tympanic membrane normal.      Left Ear: Tympanic membrane normal.      Mouth/Throat:      Mouth: Mucous membranes are moist.   Eyes:      Conjunctiva/sclera: Conjunctivae normal.   Cardiovascular:      Rate and Rhythm: Normal rate and regular rhythm.      Heart sounds: No murmur heard.     Comments: Bilateral pedal pulses intact  1-2+ left " foot and distal lower extremity edema  Pulmonary:      Effort: Pulmonary effort is normal.      Breath sounds: Normal breath sounds. No rales.   Abdominal:      General: Bowel sounds are normal.      Tenderness: There is no abdominal tenderness.   Genitourinary:     Comments: Prostate exam deferred: Sees Dr. Pan, urology  Skin:     General: Skin is warm.   Neurological:      General: No focal deficit present.      Mental Status: He is alert and oriented to person, place, and time.   Psychiatric:         Mood and Affect: Mood normal.         Assessment/Plan     Wellness exam/physical  Prevnar-20 vaccine given today  Regular exercise with goal of 120-150 minutes per week recommended  Well-balanced diet rich in fruits, vegetables, fiber, lean protein recommended  Continued routine follow-up with dentistry recommended  Referral for [employer] fitness training assessment    GERD/gastroesophageal reflux disease  Proper lifestyle measures recommended including:  Limited intake of caffeine, alcohol, spicy or acidic foods, eating large or late evening meals  Over-the-counter antiacids can be taken as needed including: Rolaids, Tums, or Pepcid  If persistent symptoms, contact office    Impaired fasting glucose (hemoglobin A1c 5.8% in 11/2023)  Overweight  Low carbohydrate/sugar diet, increased exercise, and modest weight loss of 10 pounds recommended    Macrocytic anemia (hematologic workup in 1/2023)  CBC ordered.  Monitor expectantly.    Microscopic hematuria  Normal evaluation recently completed in 1/2024  Continue follow-up with urology    Elevated PSA (with negative prostate biopsy)  Continue follow-up with urology, next appointment with Dr. Pan in July 2024    Chronic left lower extremity edema, possible venous insufficiency  Referral to vascular medicine/surgery for consultation  Low-salt diet and compression socks recommended    Eczema  History of actinic keratoses  Continue fluocinonide 0.05% cream up to twice a day as  needed  Continue routine follow-up with dermatology, Dr. Tse    History of colon polyps  Next screening colonoscopy due in 6/2027    Borderline glaucoma  Continue timolol and latanoprost  Continue routine follow-up with ophthalmology, next appointment scheduled in 1 week    Borderline AST elevation  Borderline hypocalcemia  (On 11/2023 labs)  CMP lab work ordered for evaluation    Bilateral hearing loss  Referral to audiology provided    Follow-up  1.  Office visit in September 2024  2.  Wellness exam/physical on/after 3/22/2025    Fly Loera MD

## 2024-03-28 ENCOUNTER — APPOINTMENT (OUTPATIENT)
Dept: OPHTHALMOLOGY | Facility: CLINIC | Age: 69
End: 2024-03-28
Payer: COMMERCIAL

## 2024-04-02 ENCOUNTER — CLINICAL SUPPORT (OUTPATIENT)
Dept: AUDIOLOGY | Facility: CLINIC | Age: 69
End: 2024-04-02
Payer: MEDICARE

## 2024-04-02 DIAGNOSIS — H90.3 SENSORINEURAL HEARING LOSS (SNHL) OF BOTH EARS: ICD-10-CM

## 2024-04-02 PROCEDURE — 92557 COMPREHENSIVE HEARING TEST: CPT | Performed by: AUDIOLOGIST

## 2024-04-02 PROCEDURE — 92550 TYMPANOMETRY & REFLEX THRESH: CPT | Performed by: AUDIOLOGIST

## 2024-04-02 NOTE — PROGRESS NOTES
"  ADULT AUDIOMETRIC EVALUATION    Name:  Antonio Collins  :  1955  Age:  68 y.o.  Date of Evaluation:  2024    HISTORY:  Reason for visit: Mr. Collins is seen today for an evaluation of hearing. Patient was unaccompanied. Patient was referred by Fly Loera MD.    Patient reports possible degree of hearing loss, although not perceived to be significant. He reports his wife has concerns for his hearing. 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 He endorses both of his parents developed hearing loss in their 70-80s.     Denies: history of otologic surgery, otalgia, otorrhea, and dizziness/vertigo    Hearing Aid History: Patient does not have hearing aids at this time.    EVALUATION:    See Audiogram and Immittance results under \"Media\".    RESULTS:     Otoscopic Evaluation:     RIGHT  Clear canal with tympanic membrane visualized    LEFT  Clear canal with tympanic membrane visualized    Immittance:   Immittance Measures: 226 Hz          Right Ear: Type A: Normal middle ear function         Left Ear:  Type A: Normal middle ear function    Reflexes and Reflex Decay:    Ipsilateral Reflexes (500-4000 Hz):          Probe/Stimulus Right Ear: present 500-2000 Hz       Probe/Stimulus Left Ear: present 500-4000 Hz         Audiometry:  Test Technique: Standard Audiometry under insert phones.    Reliability: Good     Pure Tone Audiometry:    Right: Hearing levels within normal limits 125-2000 Hz falling to a mild to moderate sensorineural hearing loss 0337-3670 Hz   Left: Hearing levels within normal limits 125-1000 Hz falling to a mild sloping to moderate sensorineural hearing loss 2732-5195 Hz     Speech Audiometry (via recorded, 25-words unless noted; M=masked):       Right Ear: Speech Reception Threshold (SRT) was obtained at 20 dBHL  Word Recognition Scores were Excellent (96%) in quiet when words were " presented at 60 dBHL, using the NU-6 2A word list.  Left Ear: Speech Reception Threshold (SRT) was obtained at 20 dBHL  Word Recognition Scores were Excellent (100%) in quiet when words were presented at 60 dBHL, using the NU-6 3A word list.    IMPRESSIONS:  Today's test results suggest normal middle ear function.     Right: Hearing levels within normal limits 125-2000 Hz falling to a mild to moderate sensorineural hearing loss 6013-3285 Hz with excellent word understanding.   Left: Hearing levels within normal limits 125-1000 Hz falling to a mild sloping to moderate sensorineural hearing loss 4806-2829 Hz with excellent word understanding.    PATIENT EDUCATION:   Antonio Collins was counseled with regard to the findings. Hearing aids were discussed as a management option for hearing loss. We discussed in detail the appropriate expectations of hearing aids in terms of the configuration of his hearing loss. Patient declined to pursue hearing aids at this time.      PLAN:  Follow up with Fly Loera MD as directed.  Retest hearing annually; sooner if concerns or changes arise.  Utilize good communication strategies. (i.e. face-to-face communication within 3-4 feet, reduce background noise, appropriate lighting, visual access to facial cues, etc.)        Christopher Casper, CCC-A  Clinical Audiologist    Time: 0926-0949    This note was created using speech recognition transcription software. Despite proofreading, several typographical errors might be present that might affect the meaning of the content. Please call with any questions.     Degree of   Hearing Sensitivity dB Range   Within Normal Limits (WNL) 0 - 20   Slight 25   Mild 26 - 40   Moderate 41 - 55   Moderately-Severe 56 - 70   Severe 71 - 90   Profound 91 +     KEY  TM Tympanic Membrane   WNL Within Normal Limits   HA Hearing Aid   SNHL Sensorineural Hearing Loss   CHL Conductive Hearing Loss   NIHL Noise-Induced Hearing Loss   ECV Ear Canal Volume

## 2024-04-06 ENCOUNTER — LAB (OUTPATIENT)
Dept: LAB | Facility: LAB | Age: 69
End: 2024-04-06
Payer: MEDICARE

## 2024-04-06 DIAGNOSIS — D53.9 MACROCYTIC ANEMIA: ICD-10-CM

## 2024-04-06 DIAGNOSIS — Z11.59 ENCOUNTER FOR HEPATITIS C SCREENING TEST FOR LOW RISK PATIENT: ICD-10-CM

## 2024-04-06 DIAGNOSIS — R74.01 ELEVATED AST (SGOT): ICD-10-CM

## 2024-04-06 DIAGNOSIS — R73.01 IMPAIRED FASTING GLUCOSE: ICD-10-CM

## 2024-04-06 PROCEDURE — 86803 HEPATITIS C AB TEST: CPT

## 2024-04-06 PROCEDURE — 36415 COLL VENOUS BLD VENIPUNCTURE: CPT

## 2024-04-06 PROCEDURE — 80053 COMPREHEN METABOLIC PANEL: CPT

## 2024-04-06 PROCEDURE — 85025 COMPLETE CBC W/AUTO DIFF WBC: CPT

## 2024-04-07 LAB
ALBUMIN SERPL BCP-MCNC: 4.1 G/DL (ref 3.4–5)
ALP SERPL-CCNC: 49 U/L (ref 33–136)
ALT SERPL W P-5'-P-CCNC: 13 U/L (ref 10–52)
ANION GAP SERPL CALC-SCNC: 13 MMOL/L (ref 10–20)
AST SERPL W P-5'-P-CCNC: 22 U/L (ref 9–39)
BASOPHILS # BLD AUTO: 0.05 X10*3/UL (ref 0–0.1)
BASOPHILS NFR BLD AUTO: 1.1 %
BILIRUB SERPL-MCNC: 0.9 MG/DL (ref 0–1.2)
BUN SERPL-MCNC: 17 MG/DL (ref 6–23)
CALCIUM SERPL-MCNC: 9.5 MG/DL (ref 8.6–10.6)
CHLORIDE SERPL-SCNC: 104 MMOL/L (ref 98–107)
CO2 SERPL-SCNC: 27 MMOL/L (ref 21–32)
CREAT SERPL-MCNC: 0.81 MG/DL (ref 0.5–1.3)
EGFRCR SERPLBLD CKD-EPI 2021: >90 ML/MIN/1.73M*2
EOSINOPHIL # BLD AUTO: 0.19 X10*3/UL (ref 0–0.7)
EOSINOPHIL NFR BLD AUTO: 4.1 %
ERYTHROCYTE [DISTWIDTH] IN BLOOD BY AUTOMATED COUNT: 13.3 % (ref 11.5–14.5)
GLUCOSE SERPL-MCNC: 102 MG/DL (ref 74–99)
HCT VFR BLD AUTO: 42 % (ref 41–52)
HCV AB SER QL: NONREACTIVE
HGB BLD-MCNC: 13.8 G/DL (ref 13.5–17.5)
IMM GRANULOCYTES # BLD AUTO: 0.01 X10*3/UL (ref 0–0.7)
IMM GRANULOCYTES NFR BLD AUTO: 0.2 % (ref 0–0.9)
LYMPHOCYTES # BLD AUTO: 1.42 X10*3/UL (ref 1.2–4.8)
LYMPHOCYTES NFR BLD AUTO: 30.7 %
MCH RBC QN AUTO: 33 PG (ref 26–34)
MCHC RBC AUTO-ENTMCNC: 32.9 G/DL (ref 32–36)
MCV RBC AUTO: 101 FL (ref 80–100)
MONOCYTES # BLD AUTO: 0.42 X10*3/UL (ref 0.1–1)
MONOCYTES NFR BLD AUTO: 9.1 %
NEUTROPHILS # BLD AUTO: 2.53 X10*3/UL (ref 1.2–7.7)
NEUTROPHILS NFR BLD AUTO: 54.8 %
NRBC BLD-RTO: 0 /100 WBCS (ref 0–0)
PLATELET # BLD AUTO: 246 X10*3/UL (ref 150–450)
POTASSIUM SERPL-SCNC: 4.3 MMOL/L (ref 3.5–5.3)
PROT SERPL-MCNC: 6.8 G/DL (ref 6.4–8.2)
RBC # BLD AUTO: 4.18 X10*6/UL (ref 4.5–5.9)
SODIUM SERPL-SCNC: 140 MMOL/L (ref 136–145)
WBC # BLD AUTO: 4.6 X10*3/UL (ref 4.4–11.3)

## 2024-04-08 ENCOUNTER — OFFICE VISIT (OUTPATIENT)
Dept: VASCULAR SURGERY | Facility: HOSPITAL | Age: 69
End: 2024-04-08
Payer: MEDICARE

## 2024-04-08 VITALS
OXYGEN SATURATION: 98 % | HEIGHT: 71 IN | HEART RATE: 57 BPM | SYSTOLIC BLOOD PRESSURE: 115 MMHG | WEIGHT: 198 LBS | DIASTOLIC BLOOD PRESSURE: 69 MMHG | BODY MASS INDEX: 27.72 KG/M2

## 2024-04-08 DIAGNOSIS — R60.0 EDEMA OF LEFT LOWER EXTREMITY: Primary | ICD-10-CM

## 2024-04-08 PROCEDURE — 1159F MED LIST DOCD IN RCRD: CPT | Performed by: SURGERY

## 2024-04-08 PROCEDURE — 99203 OFFICE O/P NEW LOW 30 MIN: CPT | Performed by: SURGERY

## 2024-04-08 PROCEDURE — 99213 OFFICE O/P EST LOW 20 MIN: CPT | Performed by: SURGERY

## 2024-04-08 PROCEDURE — 1036F TOBACCO NON-USER: CPT | Performed by: SURGERY

## 2024-04-08 PROCEDURE — 1160F RVW MEDS BY RX/DR IN RCRD: CPT | Performed by: SURGERY

## 2024-04-08 ASSESSMENT — ENCOUNTER SYMPTOMS
WOUND: 0
NUMBNESS: 0
HEADACHES: 0
VOMITING: 0
COLOR CHANGE: 0
LOSS OF SENSATION IN FEET: 0
JOINT SWELLING: 0
OCCASIONAL FEELINGS OF UNSTEADINESS: 0
DIZZINESS: 0
CHEST TIGHTNESS: 0
ABDOMINAL DISTENTION: 0
ACTIVITY CHANGE: 0
ABDOMINAL PAIN: 0
SHORTNESS OF BREATH: 0
DEPRESSION: 0

## 2024-04-08 NOTE — PROGRESS NOTES
Vascular Surgery Consult/Clinic Note    CC:    HPI:  Antonio Collins is 68 y.o. male with history of 10 years of left ankle swelling. No history of HF, DVT, trauma to the limb. He does not notice this being worse at any particular period of the day. Has tried using compression stockings in the past, but reports they are uncomfortable.     Meds:   Current Outpatient Medications on File Prior to Visit   Medication Sig Dispense Refill    cetirizine HCl (ZYRTEC ORAL) Take by mouth. Allergy Tabs for Family history of malignant neoplasm of prostate      fluocinonide (Lidex) 0.05 % cream Twice daily to affected areas on neck, chest, and back 60 g 3    latanoprost (Xalatan) 0.005 % ophthalmic solution INSTILL 1 DROP INTO BOTH EYES AT BEDTIME 2.5 mL 6    timolol (Timoptic) 0.5 % ophthalmic solution Administer 1 drop into both eyes once daily. For open angle with borderline findings and low glaucoma risk in both eyes      [DISCONTINUED] triamcinolone (Kenalog) 0.1 % cream Twice daily to affected areas for 3-4 weeks, then weekends only. Repeat every few months for flares. 453 g 3     No current facility-administered medications on file prior to visit.        Allergies:   No Known Allergies    SH:    Social Determinants of Health     Tobacco Use: Low Risk  (4/8/2024)    Patient History     Smoking Tobacco Use: Never     Smokeless Tobacco Use: Never     Passive Exposure: Never   Alcohol Use: Not on file   Financial Resource Strain: Not on file   Food Insecurity: Not on file   Transportation Needs: Not on file   Physical Activity: Not on file   Stress: Not on file   Social Connections: Not on file   Intimate Partner Violence: Not on file   Depression: Not on file   Housing Stability: Not on file   Utilities: Not on file   Digital Equity: Not on file        FH:  Family History   Problem Relation Name Age of Onset    Other (chronic kidney disease) Mother          In 80s    Glaucoma Father      Prostate cancer Father       Transient ischemic attack Father          In early 80s    Thyroid disease Sister Jeanne     Thyroid disease Sister Echo     Other (Adopted) Son Antonio     Other (Adopted) Son Cristo     Lung cancer Mother's Brother      Pancreatic cancer Father's Sister      Prostate cancer Father's Brother          in 3 uncles    Brain cancer Maternal Grandmother           at 68    Heart attack Maternal Grandfather           with event at 77        ROS:  Review of Systems   Constitutional:  Negative for activity change.   HENT:  Negative for congestion.    Respiratory:  Negative for chest tightness and shortness of breath.    Gastrointestinal:  Negative for abdominal distention, abdominal pain and vomiting.   Musculoskeletal:  Negative for gait problem and joint swelling.   Skin:  Negative for color change, rash and wound.   Neurological:  Negative for dizziness, numbness and headaches.        Objective:  Vitals:  Vitals:    24 0748   BP: 115/69   Pulse: 57   SpO2: 98%        Exam:  Gen: in NAD  GI: Soft, ND/NT  Ext:  Palpable pedal pulses, LLE with 2+ swelling from ankle to mid-calf; no swelling in foot or more proximally    Imaging:  Venous duplex from 2/10/2023 negative for DVTs    Assessment & Plan:  Antonio Collins is 68 y.o. male who is presents with LLE leg swelling x10 years. Encouraged to use 20-30 mmHg graded compression. Will check venous reflux study.    Tiago Roe MD  Vascular Surgery Fellow    Wood Ojeda MD, PhD  Clinical   [employer]  Co-Director, Aortic Center  [facility] [place]

## 2024-05-06 ENCOUNTER — HOSPITAL ENCOUNTER (OUTPATIENT)
Dept: VASCULAR MEDICINE | Facility: HOSPITAL | Age: 69
Discharge: HOME | End: 2024-05-06
Payer: MEDICARE

## 2024-05-06 ENCOUNTER — APPOINTMENT (OUTPATIENT)
Dept: VASCULAR SURGERY | Facility: HOSPITAL | Age: 69
End: 2024-05-06
Payer: MEDICARE

## 2024-05-06 DIAGNOSIS — R60.0 EDEMA OF LEFT LOWER EXTREMITY: ICD-10-CM

## 2024-05-06 PROCEDURE — 93971 EXTREMITY STUDY: CPT | Performed by: SURGERY

## 2024-05-06 PROCEDURE — 93971 EXTREMITY STUDY: CPT

## 2024-05-20 DIAGNOSIS — T75.3XXA MOTION SICKNESS, INITIAL ENCOUNTER: ICD-10-CM

## 2024-05-21 RX ORDER — SCOLOPAMINE TRANSDERMAL SYSTEM 1 MG/1
1 PATCH, EXTENDED RELEASE TRANSDERMAL
Qty: 10 PATCH | Refills: 0 | Status: SHIPPED | OUTPATIENT
Start: 2024-05-21 | End: 2024-07-20

## 2024-05-29 DIAGNOSIS — H40.013 OPEN ANGLE WITH BORDERLINE FINDINGS, LOW RISK, BILATERAL: ICD-10-CM

## 2024-05-29 RX ORDER — LATANOPROST 50 UG/ML
1 SOLUTION/ DROPS OPHTHALMIC NIGHTLY
Qty: 2.5 ML | Refills: 6 | Status: SHIPPED | OUTPATIENT
Start: 2024-05-29

## 2024-06-25 ENCOUNTER — APPOINTMENT (OUTPATIENT)
Dept: DERMATOLOGY | Facility: CLINIC | Age: 69
End: 2024-06-25
Payer: MEDICARE

## 2024-07-01 ENCOUNTER — OFFICE VISIT (OUTPATIENT)
Dept: VASCULAR SURGERY | Facility: HOSPITAL | Age: 69
End: 2024-07-01
Payer: MEDICARE

## 2024-07-01 VITALS
SYSTOLIC BLOOD PRESSURE: 112 MMHG | HEIGHT: 72 IN | BODY MASS INDEX: 27.36 KG/M2 | DIASTOLIC BLOOD PRESSURE: 57 MMHG | HEART RATE: 55 BPM | WEIGHT: 202 LBS | OXYGEN SATURATION: 98 %

## 2024-07-01 DIAGNOSIS — I83.90 VARICOSE VEINS OF LOWER EXTREMITY, UNSPECIFIED LATERALITY, UNSPECIFIED WHETHER COMPLICATED: Primary | ICD-10-CM

## 2024-07-01 PROCEDURE — 99213 OFFICE O/P EST LOW 20 MIN: CPT | Performed by: SURGERY

## 2024-07-01 PROCEDURE — 1036F TOBACCO NON-USER: CPT | Performed by: SURGERY

## 2024-07-01 PROCEDURE — 1159F MED LIST DOCD IN RCRD: CPT | Performed by: SURGERY

## 2024-07-01 ASSESSMENT — ENCOUNTER SYMPTOMS
DEPRESSION: 0
OCCASIONAL FEELINGS OF UNSTEADINESS: 0
LOSS OF SENSATION IN FEET: 0

## 2024-07-01 NOTE — PROGRESS NOTES
Vascular Surgery Consult/Clinic Note    CC: Follow up    HPI:  Antonio Collins is 68 y.o. male with history of 10 years of left ankle swelling. No history of HF, DVT, trauma to the limb. He is using compression stockings with minimal improvement.   He returns after obtaining venous reflux study.     Meds:   Current Outpatient Medications on File Prior to Visit   Medication Sig Dispense Refill    cetirizine HCl (ZYRTEC ORAL) Take by mouth. Allergy Tabs for Family history of malignant neoplasm of prostate      fluocinonide (Lidex) 0.05 % cream Twice daily to affected areas on neck, chest, and back 60 g 3    latanoprost (Xalatan) 0.005 % ophthalmic solution Administer 1 drop into both eyes once daily at bedtime. 2.5 mL 6    timolol (Timoptic) 0.5 % ophthalmic solution Administer 1 drop into both eyes once daily. For open angle with borderline findings and low glaucoma risk in both eyes      [DISCONTINUED] scopolamine (Transderm-Scop) 1 mg over 3 days patch 3 day Place 1 patch over 72 hours on the skin every 3rd day if needed (nausea). 10 patch 0     No current facility-administered medications on file prior to visit.        Allergies:   No Known Allergies    SH:    Social Determinants of Health     Tobacco Use: Low Risk  (7/1/2024)    Patient History     Smoking Tobacco Use: Never     Smokeless Tobacco Use: Never     Passive Exposure: Never   Alcohol Use: Not on file   Financial Resource Strain: Not on file   Food Insecurity: Not on file   Transportation Needs: Not on file   Physical Activity: Not on file   Stress: Not on file   Social Connections: Not on file   Intimate Partner Violence: Not on file   Depression: Not on file   Housing Stability: Not on file   Utilities: Not on file   Digital Equity: Not on file   Health Literacy: Not on file        FH:  Family History   Problem Relation Name Age of Onset    Other (chronic kidney disease) Mother          In 80s    Glaucoma Father      Prostate cancer Father       Transient ischemic attack Father          In early 80s    Thyroid disease Sister Jeanne     Thyroid disease Sister Echo     Other (Adopted) Son Antonio     Other (Adopted) Son Cristo     Lung cancer Mother's Brother      Pancreatic cancer Father's Sister      Prostate cancer Father's Brother          in 3 uncles    Brain cancer Maternal Grandmother           at 68    Heart attack Maternal Grandfather           with event at 77        Objective:  Vitals:  Vitals:    24 1352   BP: 112/57   Pulse:    SpO2:         Exam:  Gen: in NAD  GI: Soft, ND/NT  Ext:  Palpable pedal pulses, LLE with 2+ swelling from ankle to mid-calf; no swelling in foot or more proximally     Imaging:  LLE venous reflux study (2024):  Left Lower Venous Insufficiency: There is reflux noted in the mid femoral vein. 0.9 seconds of reflux is noted in a  of the great saphenous vein at the distal calf. It has a diameter of 2.7 mm and a depth of 10.9 mm as it leaves the fascial plane.  Left Lower Venous: No evidence of acute deep vein thrombus visualized in the left lower extremity. Duplicated femoral vein noted.    Venous duplex from 2/10/2023 negative for DVTs     Assessment & Plan:  Antonio Collins is 69 y.o. male LLE venous reflux.    - Cont. Compression stocking.    - Referral made to Dr. Rodriguez for evaluation.       Wood Ojeda MD, PhD  Clinical   [facility] [employer]  Co-Director, Aortic Center  [facility] [place]

## 2024-07-18 ENCOUNTER — APPOINTMENT (OUTPATIENT)
Dept: UROLOGY | Facility: HOSPITAL | Age: 69
End: 2024-07-18
Payer: COMMERCIAL

## 2024-08-07 NOTE — PROGRESS NOTES
"FUV    Last seen - 5/26/23     HISTORY OF PRESENT ILLNESS:   Antonio Collins is a 69 y.o. male who is being seen today for review of POCT UA results.     FHx of prostate cancer with father and 3 uncles all dx with prostate cancer. He denies any hx of breast cancer in family.    PAST MEDICAL HISTORY:  Past Medical History:   Diagnosis Date    Other specified health status 05/13/2019    No pertinent past medical history    Polyp of vocal cord and larynx     Polyp, vocal cord   - Elevated PSA    PAST SURGICAL HISTORY:  Past Surgical History:   Procedure Laterality Date    OTHER SURGICAL HISTORY  2003    Vocal cord nodule resection x2 in 2003/2004    OTHER SURGICAL HISTORY      No surgery, traction in hospital x 7 weeks, age 9    OTHER SURGICAL HISTORY  1993    Varicocelectomy    VASECTOMY      Late 1990s        ALLERGIES:   No Known Allergies     MEDICATIONS:   Current Outpatient Medications   Medication Instructions    cetirizine HCl (ZYRTEC ORAL) oral, Allergy Tabs for Family history of malignant neoplasm of prostate    fluocinonide (Lidex) 0.05 % cream Twice daily to affected areas on neck, chest, and back    latanoprost (Xalatan) 0.005 % ophthalmic solution 1 drop, Both Eyes, Nightly    timolol (Timoptic) 0.5 % ophthalmic solution 1 drop, Both Eyes, Daily, For open angle with borderline findings and low glaucoma risk in both eyes        PHYSICAL EXAM:  There were no vitals taken for this visit.  Constitutional: Patient appears well-developed and well-nourished. No distress.    Pulmonary/Chest: Effort normal. No respiratory distress.   Abdominal: Soft, ND NT  : WNL  Musculoskeletal: Normal range of motion.    Neurological: Alert and oriented to person, place, and time.  Psychiatric: Normal mood and affect. Behavior is normal. Thought content normal.      Labs:  No results found for: \"TESTOSTERONE\"  Lab Results   Component Value Date    PSA 2.1 05/09/2023   Most recent PSA was 3.2 on 11/27/23.    No components " "found for: \"CBC\"  Lab Results   Component Value Date    CREATININE 0.81 04/06/2024     No components found for: \"TESTOTMS\"  No results found for: \"TESTF\"  Imagining:   - CT Urogram on 1/5/24 resulted negative.  - Results reviewed with patient. Patient reassured.     Discussion:  Doing well, no complaints. UC resulted negative. Denies any dysuria, hematuria, bothersome urinary frequency, urgency, or flank pain. Patient denies any pushing or straining to urinate. No complaints of ED. He wishes to opt for PCP to check PSA yearly.  Assessment:      1. Lower urinary tract infectious disease  POCT UA Automated manually resulted          Antonio Collins is a 69 y.o. male here for FUV     Plan:   Follow up in 1 year for repeat POCT UA.  All questions and concerns were addressed. Patient verbalizes understanding and has no other questions at this time.     Scribe Attestation  By signing my name below, IEcho Scribe   attest that this documentation has been prepared under the direction and in the presence of Sal Pan MD.   "

## 2024-08-08 ENCOUNTER — OFFICE VISIT (OUTPATIENT)
Dept: UROLOGY | Facility: HOSPITAL | Age: 69
End: 2024-08-08
Payer: MEDICARE

## 2024-08-08 ENCOUNTER — APPOINTMENT (OUTPATIENT)
Dept: OPHTHALMOLOGY | Facility: CLINIC | Age: 69
End: 2024-08-08
Payer: COMMERCIAL

## 2024-08-08 DIAGNOSIS — N39.0 LOWER URINARY TRACT INFECTIOUS DISEASE: Primary | ICD-10-CM

## 2024-08-08 DIAGNOSIS — H40.1131 PRIMARY OPEN ANGLE GLAUCOMA OF BOTH EYES, MILD STAGE: Primary | ICD-10-CM

## 2024-08-08 DIAGNOSIS — H40.013 OPEN ANGLE WITH BORDERLINE FINDINGS, LOW RISK, BILATERAL: ICD-10-CM

## 2024-08-08 DIAGNOSIS — H25.813 COMBINED FORMS OF AGE-RELATED CATARACT OF BOTH EYES: ICD-10-CM

## 2024-08-08 LAB
POC APPEARANCE, URINE: CLEAR
POC BILIRUBIN, URINE: NEGATIVE
POC BLOOD, URINE: NEGATIVE
POC COLOR, URINE: YELLOW
POC GLUCOSE, URINE: NEGATIVE MG/DL
POC KETONES, URINE: NEGATIVE MG/DL
POC LEUKOCYTES, URINE: NEGATIVE
POC NITRITE,URINE: NEGATIVE
POC PH, URINE: 8 PH
POC PROTEIN, URINE: NEGATIVE MG/DL
POC SPECIFIC GRAVITY, URINE: 1.02
POC UROBILINOGEN, URINE: 0.2 EU/DL

## 2024-08-08 PROCEDURE — 99214 OFFICE O/P EST MOD 30 MIN: CPT | Performed by: UROLOGY

## 2024-08-08 PROCEDURE — 92133 CPTRZD OPH DX IMG PST SGM ON: CPT | Performed by: OPHTHALMOLOGY

## 2024-08-08 PROCEDURE — 99214 OFFICE O/P EST MOD 30 MIN: CPT | Performed by: OPHTHALMOLOGY

## 2024-08-08 PROCEDURE — 92083 EXTENDED VISUAL FIELD XM: CPT | Performed by: OPHTHALMOLOGY

## 2024-08-08 PROCEDURE — 81003 URINALYSIS AUTO W/O SCOPE: CPT | Performed by: UROLOGY

## 2024-08-08 PROCEDURE — 51798 US URINE CAPACITY MEASURE: CPT | Performed by: UROLOGY

## 2024-08-08 PROCEDURE — 1036F TOBACCO NON-USER: CPT | Performed by: UROLOGY

## 2024-08-08 RX ORDER — TIMOLOL MALEATE 5 MG/ML
1 SOLUTION/ DROPS OPHTHALMIC DAILY
Qty: 10 ML | Refills: 11 | Status: SHIPPED | OUTPATIENT
Start: 2024-08-08

## 2024-08-08 RX ORDER — LATANOPROST 50 UG/ML
1 SOLUTION/ DROPS OPHTHALMIC NIGHTLY
Qty: 2.5 ML | Refills: 6 | Status: SHIPPED | OUTPATIENT
Start: 2024-08-08

## 2024-08-08 ASSESSMENT — ENCOUNTER SYMPTOMS
RESPIRATORY NEGATIVE: 0
CARDIOVASCULAR NEGATIVE: 0
ENDOCRINE NEGATIVE: 0
MUSCULOSKELETAL NEGATIVE: 0
ALLERGIC/IMMUNOLOGIC NEGATIVE: 0
EYES NEGATIVE: 0
PSYCHIATRIC NEGATIVE: 0
HEMATOLOGIC/LYMPHATIC NEGATIVE: 0
GASTROINTESTINAL NEGATIVE: 0
CONSTITUTIONAL NEGATIVE: 0
NEUROLOGICAL NEGATIVE: 0

## 2024-08-08 ASSESSMENT — SLIT LAMP EXAM - LIDS
COMMENTS: GOOD POSITION
COMMENTS: GOOD POSITION

## 2024-08-08 ASSESSMENT — REFRACTION_WEARINGRX
OS_ADD: +2.50
OS_AXIS: 031
OS_SPHERE: -4.75
OS_CYLINDER: -3.25
OD_ADD: +2.50
OD_SPHERE: -3.75
OD_CYLINDER: -2.00
OD_AXIS: 161

## 2024-08-08 ASSESSMENT — TONOMETRY
IOP_METHOD: GOLDMANN APPLANATION
OS_IOP_MMHG: 12
OD_IOP_MMHG: 10

## 2024-08-08 ASSESSMENT — CUP TO DISC RATIO
OD_RATIO: 0.5
OS_RATIO: 0.5

## 2024-08-08 ASSESSMENT — PACHYMETRY
OD_CT(UM): 575
OS_CT(UM): 572

## 2024-08-08 ASSESSMENT — CONF VISUAL FIELD
OS_INFERIOR_TEMPORAL_RESTRICTION: 0
OD_INFERIOR_NASAL_RESTRICTION: 0
OS_NORMAL: 1
OD_SUPERIOR_NASAL_RESTRICTION: 0
OS_SUPERIOR_NASAL_RESTRICTION: 0
OS_INFERIOR_NASAL_RESTRICTION: 0
OD_INFERIOR_TEMPORAL_RESTRICTION: 0
OD_SUPERIOR_TEMPORAL_RESTRICTION: 0
OD_NORMAL: 1
OS_SUPERIOR_TEMPORAL_RESTRICTION: 0

## 2024-08-08 ASSESSMENT — VISUAL ACUITY
OD_CC: 20/30
OS_CC: 20/30
METHOD: SNELLEN - LINEAR
CORRECTION_TYPE: GLASSES

## 2024-08-08 ASSESSMENT — EXTERNAL EXAM - LEFT EYE: OS_EXAM: NORMAL

## 2024-08-08 ASSESSMENT — EXTERNAL EXAM - RIGHT EYE: OD_EXAM: NORMAL

## 2024-08-08 NOTE — PROGRESS NOTES
"Visual Acuity (Snellen - Linear)         Right Left    Dist cc 20/30 20/30    Dist ph cc NI NI      Correction: Glasses          Tonometry       Tonometry (Goldmann Applanation, 8:33 AM)         Right Left    Pressure 10 12                  Assessment/Plan   Last dilated:  8/8/24    1.  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  Abnl on RNFl and VF likely secondary to anomalous ON.  IOPs have gradually increased.  Pt with RNFL progression OS.  Latanoprost   lowered IOP, but not enough. Now IOPs are very well controlled and without VF progression      Plan:  cont latanoprost OU QHS             cont timolol OU Qam             f/u 6 month     2.  PVD OU:  asymptomatic, but RDW warnings reviewed due to RNFL findings      Plan:  monitor    3.  Cataracts OU:  mildly visually significant      Plan:  monitor   "

## 2024-09-24 ENCOUNTER — APPOINTMENT (OUTPATIENT)
Dept: PRIMARY CARE | Facility: CLINIC | Age: 69
End: 2024-09-24
Payer: COMMERCIAL

## 2024-09-26 ENCOUNTER — OFFICE VISIT (OUTPATIENT)
Dept: VASCULAR SURGERY | Facility: CLINIC | Age: 69
End: 2024-09-26
Payer: COMMERCIAL

## 2024-09-26 VITALS
HEIGHT: 72 IN | BODY MASS INDEX: 28 KG/M2 | SYSTOLIC BLOOD PRESSURE: 121 MMHG | WEIGHT: 206.7 LBS | HEART RATE: 55 BPM | DIASTOLIC BLOOD PRESSURE: 70 MMHG

## 2024-09-26 DIAGNOSIS — I83.90 VARICOSE VEINS OF LOWER EXTREMITY, UNSPECIFIED LATERALITY, UNSPECIFIED WHETHER COMPLICATED: ICD-10-CM

## 2024-09-26 DIAGNOSIS — R60.0 LEG EDEMA, LEFT: Primary | ICD-10-CM

## 2024-09-26 PROCEDURE — 1159F MED LIST DOCD IN RCRD: CPT | Performed by: SURGERY

## 2024-09-26 PROCEDURE — 1036F TOBACCO NON-USER: CPT | Performed by: SURGERY

## 2024-09-26 PROCEDURE — 99214 OFFICE O/P EST MOD 30 MIN: CPT | Performed by: SURGERY

## 2024-09-26 PROCEDURE — 1126F AMNT PAIN NOTED NONE PRSNT: CPT | Performed by: SURGERY

## 2024-09-26 PROCEDURE — 3008F BODY MASS INDEX DOCD: CPT | Performed by: SURGERY

## 2024-09-26 ASSESSMENT — PATIENT HEALTH QUESTIONNAIRE - PHQ9
1. LITTLE INTEREST OR PLEASURE IN DOING THINGS: NOT AT ALL
2. FEELING DOWN, DEPRESSED OR HOPELESS: NOT AT ALL
SUM OF ALL RESPONSES TO PHQ9 QUESTIONS 1 AND 2: 0

## 2024-09-26 ASSESSMENT — ENCOUNTER SYMPTOMS
OCCASIONAL FEELINGS OF UNSTEADINESS: 0
LOSS OF SENSATION IN FEET: 0
DEPRESSION: 0

## 2024-09-26 ASSESSMENT — COLUMBIA-SUICIDE SEVERITY RATING SCALE - C-SSRS
6. HAVE YOU EVER DONE ANYTHING, STARTED TO DO ANYTHING, OR PREPARED TO DO ANYTHING TO END YOUR LIFE?: NO
2. HAVE YOU ACTUALLY HAD ANY THOUGHTS OF KILLING YOURSELF?: NO
1. IN THE PAST MONTH, HAVE YOU WISHED YOU WERE DEAD OR WISHED YOU COULD GO TO SLEEP AND NOT WAKE UP?: NO

## 2024-09-26 ASSESSMENT — PAIN SCALES - GENERAL: PAINLEVEL: 0-NO PAIN

## 2024-09-26 NOTE — PATIENT INSTRUCTIONS
It was a pleasure taking care of you today and appreciate your seeing us at our [facility] [place] [facility].     Today's plan is as follows:  1) I would continue compression therapy for now 20-30mmHg knee high  2) I can then see you back at your convenience in about 9 months for a clinical evaluation      Please call the office with any questions at [phone].   You can speak to our secretaries or our clinical nurses for specific questions.   For [facility] Center specific questions, you can also call [phone] or email at [email or web address]  If you need coordinating your appointments and testing you can do these at the  or by calling my office shortly after your visit.

## 2024-09-26 NOTE — PROGRESS NOTES
F/U REASON: left leg/ankle edema    CURRENT ENCOUNTER:  Antonio Collins is 69 y.o. male here for follow up of  left ankle edema.  Left ankle swelling over last 6 yrs  Less so on the right  Some new reticular change seen on the medial ankle  Once in a while has some ankle ache at end of day  Not daily  No prior ankle injury - about 1.5 yrs ago had eval with xray  H/o left hallux fracture  - 20 yrs ago   No h/o thrombosis.   No prior procedures (venous)  No edema above the ankle  Started compression therapy 4-6 wks ago - 20-30mmHg - does help but swells a bit still; wears daily.    RIGHT LEG C0 NO visible venous disease  RIGHT LEG none  RIGHT LEG CEAP: C0  RIGHT LEG VCSS SCORE: 0    LEFT LEG C3 Edema  LEFT LEG VENOUS EDEMA 1 and USE OF COMPRESSION 3  LEFT LEG CEAP: C3  LEFT LEG VCSS SCORE: 4    Meds:   Current Outpatient Medications:     cetirizine HCl (ZYRTEC ORAL), Take by mouth. Allergy Tabs for Family history of malignant neoplasm of prostate, Disp: , Rfl:     fluocinonide (Lidex) 0.05 % cream, Twice daily to affected areas on neck, chest, and back, Disp: 60 g, Rfl: 3    latanoprost (Xalatan) 0.005 % ophthalmic solution, Administer 1 drop into both eyes once daily at bedtime., Disp: 2.5 mL, Rfl: 6    timolol (Timoptic) 0.5 % ophthalmic solution, Administer 1 drop into both eyes once daily. For open angle with borderline findings and low glaucoma risk in both eyes, Disp: 10 mL, Rfl: 11    Allergies:   No Known Allergies    ROS:  Review of Systems    otherwise unremarkable    Objective:  Vitals:  Vitals:    09/26/24 1040   BP: 121/70   Pulse:         Exam:  No distress  Breathing comfortably   Not tachycardic  Abd soft, nt, nd  B/l legs with intact pulses and well perfused feet  +left ankle edema  Some early venous changes in left ankle - reticular vv; circumferential ankle edema  Extends a bit into the foot.                Labs:  Lab Results   Component Value Date    WBC 4.6 04/06/2024    WBC 5.2 11/27/2023     WBC 4.7 01/23/2023    HGB 13.8 04/06/2024    HGB 13.0 (L) 11/27/2023    HGB 13.0 (L) 01/23/2023    HCT 42.0 04/06/2024    HCT 39.7 (L) 11/27/2023    HCT 38.8 (L) 01/23/2023     (H) 04/06/2024     (H) 11/27/2023    MCV 99 01/23/2023     04/06/2024     Lab Results   Component Value Date    CREATININE 0.81 04/06/2024    CREATININE 0.79 11/27/2023    CREATININE 0.78 03/25/2022    BUN 17 04/06/2024    BUN 16 11/27/2023    BUN 16 03/25/2022     04/06/2024     11/27/2023     03/25/2022    K 4.3 04/06/2024    K 4.1 11/27/2023    K 4.3 03/25/2022     04/06/2024     11/27/2023     03/25/2022    CO2 27 04/06/2024    CO2 28 11/27/2023    CO2 28 03/25/2022         Imaging:            Assessment & Plan:  Antonio Collins is 69 y.o. male here for follow up of  left ankle edema.  Left ankle swelling over last 6 yrs  No edema above the ankle  Started compression therapy 4-6 wks ago - 20-30mmHg - does help but swells a bit still; wears daily.    RIGHT LEG C0 NO visible venous disease  RIGHT LEG VCSS SCORE: 0  LEFT LEG C3 Edema  LEFT LEG VCSS SCORE: 4    Sub-threshold criteria for pathologic    Early venous changes, edema, min/no pain  Will continue compression therapy at this time and see me back next summer.       1) I would continue compression therapy for now 20-30mmHg knee high  2) I can then see you back at your convenience in about 9 months for a clinical evaluation    Arthur Rodriguez MD, MHS, RPVI  , [facility] [employer]  Director, [facility], [facility]  Co-Director, Vascular Laboratories, [facility]  Division of Vascular Surgery and Endovascular Therapy  [facility]

## 2024-10-09 ENCOUNTER — APPOINTMENT (OUTPATIENT)
Dept: PRIMARY CARE | Facility: CLINIC | Age: 69
End: 2024-10-09
Payer: COMMERCIAL

## 2024-10-09 VITALS
HEART RATE: 57 BPM | SYSTOLIC BLOOD PRESSURE: 108 MMHG | DIASTOLIC BLOOD PRESSURE: 62 MMHG | OXYGEN SATURATION: 98 % | WEIGHT: 201 LBS | BODY MASS INDEX: 27.26 KG/M2

## 2024-10-09 DIAGNOSIS — R60.0 EDEMA OF LEFT LOWER EXTREMITY: ICD-10-CM

## 2024-10-09 DIAGNOSIS — Z00.00 WELLNESS EXAMINATION: Primary | ICD-10-CM

## 2024-10-09 DIAGNOSIS — Z23 FLU VACCINE NEED: ICD-10-CM

## 2024-10-09 DIAGNOSIS — R73.01 IMPAIRED FASTING GLUCOSE: Primary | ICD-10-CM

## 2024-10-09 DIAGNOSIS — L57.0 ACTINIC KERATOSIS: ICD-10-CM

## 2024-10-09 DIAGNOSIS — H40.013 OPEN ANGLE WITH BORDERLINE FINDINGS AND LOW GLAUCOMA RISK IN BOTH EYES: ICD-10-CM

## 2024-10-09 DIAGNOSIS — R97.20 ELEVATED PSA: ICD-10-CM

## 2024-10-09 PROBLEM — H40.009 BORDERLINE GLAUCOMA: Status: RESOLVED | Noted: 2023-04-07 | Resolved: 2024-10-09

## 2024-10-09 PROBLEM — H40.059 BORDERLINE GLAUCOMA WITH OCULAR HYPERTENSION: Status: RESOLVED | Noted: 2023-04-07 | Resolved: 2024-10-09

## 2024-10-09 PROCEDURE — 90471 IMMUNIZATION ADMIN: CPT | Performed by: INTERNAL MEDICINE

## 2024-10-09 PROCEDURE — 1036F TOBACCO NON-USER: CPT | Performed by: INTERNAL MEDICINE

## 2024-10-09 PROCEDURE — 90662 IIV NO PRSV INCREASED AG IM: CPT | Performed by: INTERNAL MEDICINE

## 2024-10-09 PROCEDURE — 99213 OFFICE O/P EST LOW 20 MIN: CPT | Performed by: INTERNAL MEDICINE

## 2024-10-09 PROCEDURE — 1159F MED LIST DOCD IN RCRD: CPT | Performed by: INTERNAL MEDICINE

## 2024-10-09 ASSESSMENT — ENCOUNTER SYMPTOMS
SHORTNESS OF BREATH: 0
ARTHRALGIAS: 1
DIARRHEA: 0
LIGHT-HEADEDNESS: 0
HEADACHES: 0
CONSTIPATION: 0
DIZZINESS: 0
DYSURIA: 0

## 2024-10-09 NOTE — PATIENT INSTRUCTIONS
Impaired fasting glucose   Overweight  Continue to exercise with goal of 120-150 minutes/week recommended  Low carbohydrate/low sugar diet recommended  Modest weight loss of 10 pounds recommended.    Elevated PSA (with negative prostate biopsy)  Continue follow-up with urology, next appointment with Dr. Pan in 8/2025     Chronic left lower extremity edema, venous insufficiency (completed evaluation per vascular surgery)  Use support hose as needed, maintain low-salt diet, elevate legs as needed  Follow-up with Dr. Rodriguez in 6/2025    Eczema  History of actinic keratoses  Continue fluocinonide 0.05% cream up to twice a day as needed  Continue routine follow-up with dermatology, Dr. Tse     History of colon polyps  Next screening colonoscopy due in 6/2027     Glaucoma  Continue timolol and latanoprost  Continue routine follow-up with ophthalmology, Dr. Tobar    New onset left shoulder pain  Continue follow-up with chiropractor   Patient plans to start work with  for strengthening exercises as well    Health maintenance  High-dose influenza vaccine given today  RSV vaccine discussed and information provided    Follow-up  Wellness exam/physical in 3/2025 (as scheduled)  (Fasting lab work be ordered to complete 3 to 5 days prior)

## 2024-10-09 NOTE — PROGRESS NOTES
Subjective   Patient ID: Antonio Collins is a 69 y.o. male who presents for Follow-up.    Routine follow-up    Impaired fasting glucose   Overweight  Weight is about the same as last visit, 201 pounds.  Keeping fairly active, lifestyle measures reviewed.    Elevated PSA (with negative prostate biopsy)  Most recent visit with Dr. Pan in 8/24.  No changes.  Follow-up in 1 year.  No new urinary symptoms  Continue follow-up with urology, next appointment with Dr. Pan in 8/2025     Chronic left lower extremity edema, venous insufficiency   Patient has seen Dr. Rodriguez who completed evaluation.  Compression recommended.  Next appointment scheduled in 6/2025    History of actinic keratoses  Recent rash on chest  Dr. Tse has seen patient yesterday for chest rash which is improving.  Topical treatment recommended.  Full-body skin exam scheduled in future.     History of colon polyps  Next screening colonoscopy due in 6/2027     Glaucoma  Doing well on same medication, next appointment with  ophthalmology, Dr. Tobar, scheduled in February    New onset left shoulder pain  Possible injury playing golf 3 weeks ago.  Patient saw chiropractor yesterday with manipulation with improvement.  Denies any decrease in range of motion.  Only experiencing pain when lying on left side in bed.  Less painful today after treatment  Patient plans to start working with  for neck and lower back strengthening.    Health maintenance  High-dose influenza vaccine given today  RSV vaccine discussed and information provided           Review of Systems   Eyes:  Negative for visual disturbance.   Respiratory:  Negative for shortness of breath.    Gastrointestinal:  Negative for constipation and diarrhea.   Genitourinary:  Negative for dysuria and urgency.   Musculoskeletal:  Positive for arthralgias.   Neurological:  Negative for dizziness, light-headedness and headaches.       Objective   /62 (BP Location: Left arm,  Patient Position: Sitting, BP Cuff Size: Adult)   Pulse 57   Wt 91.2 kg (201 lb)   SpO2 98%   BMI 27.26 kg/m²     Physical Exam  Vitals reviewed.   HENT:      Head: Normocephalic.      Mouth/Throat:      Mouth: Mucous membranes are moist.   Eyes:      Conjunctiva/sclera: Conjunctivae normal.   Cardiovascular:      Rate and Rhythm: Normal rate and regular rhythm.   Pulmonary:      Effort: Pulmonary effort is normal.      Breath sounds: Normal breath sounds. No rales.   Abdominal:      General: Bowel sounds are normal.      Palpations: Abdomen is soft.      Tenderness: There is no abdominal tenderness.   Musculoskeletal:      Right lower leg: No edema.      Comments: Trace left lower extremity edema  Left shoulder: Full range of motion, no impingement.  No pain with active or passive range of motion.   Neurological:      General: No focal deficit present.      Mental Status: He is alert.   Psychiatric:         Mood and Affect: Mood normal.         Assessment/Plan     Impaired fasting glucose   Overweight  Continue to exercise with goal of 120-150 minutes/week recommended  Low carbohydrate/low sugar diet recommended  Modest weight loss of 10 pounds recommended.    Elevated PSA (with negative prostate biopsy)  Continue follow-up with urology, next appointment with Dr. Pan in 8/2025     Chronic left lower extremity edema, venous insufficiency (completed evaluation per vascular surgery)  Use support hose as needed, maintain low-salt diet, elevate legs as needed  Follow-up with Dr. Rodriguez in 6/2025    Eczema  History of actinic keratoses  Continue fluocinonide 0.05% cream up to twice a day as needed  Continue routine follow-up with dermatology, Dr. Tse     History of colon polyps  Next screening colonoscopy due in 6/2027     Glaucoma  Continue timolol and latanoprost  Continue routine follow-up with ophthalmology, Dr. Tobar    New onset left shoulder pain  Continue follow-up with chiropractor   Patient plans to  start work with  for strengthening exercises as well    Health maintenance  High-dose influenza vaccine given today  RSV vaccine discussed and information provided    Follow-up  Wellness exam/physical in 3/2025 (as scheduled)  (Fasting lab work be ordered to complete 3 to 5 days prior)    Fly Loera MD

## 2024-10-17 ENCOUNTER — OFFICE VISIT (OUTPATIENT)
Dept: URGENT CARE | Age: 69
End: 2024-10-17
Payer: MEDICARE

## 2024-10-17 VITALS
OXYGEN SATURATION: 97 % | BODY MASS INDEX: 26.85 KG/M2 | TEMPERATURE: 98.1 F | RESPIRATION RATE: 16 BRPM | HEART RATE: 60 BPM | WEIGHT: 198 LBS | SYSTOLIC BLOOD PRESSURE: 106 MMHG | DIASTOLIC BLOOD PRESSURE: 66 MMHG

## 2024-10-17 DIAGNOSIS — Z20.822 COVID-19 VIRUS RNA NOT DETECTED: ICD-10-CM

## 2024-10-17 DIAGNOSIS — J06.9 VIRAL UPPER RESPIRATORY TRACT INFECTION: ICD-10-CM

## 2024-10-17 DIAGNOSIS — J01.00 ACUTE NON-RECURRENT MAXILLARY SINUSITIS: Primary | ICD-10-CM

## 2024-10-17 LAB
POC RAPID INFLUENZA A: NEGATIVE
POC RAPID INFLUENZA B: NEGATIVE
POC RAPID STREP: NEGATIVE
POC SARS-COV-2 AG BINAX: NORMAL

## 2024-10-17 RX ORDER — AMOXICILLIN 875 MG/1
875 TABLET, FILM COATED ORAL 2 TIMES DAILY
Qty: 20 TABLET | Refills: 0 | Status: SHIPPED | OUTPATIENT
Start: 2024-10-17 | End: 2024-10-27

## 2024-10-17 ASSESSMENT — PAIN SCALES - GENERAL: PAINLEVEL_OUTOF10: 5

## 2024-10-17 NOTE — PROGRESS NOTES
Subjective   Patient ID: Antonio Collins is a 69 y.o. male. They present today with a chief complaint of URI.    History of Present Illness  68 yo male coming in for sinus pressure and sore throat. He states he does have post-nasal drainage as well.     Past Medical History  Allergies as of 10/17/2024    (No Known Allergies)       (Not in a hospital admission)       Past Medical History:   Diagnosis Date    Other specified health status 05/13/2019    No pertinent past medical history    Polyp of vocal cord and larynx     Polyp, vocal cord       Past Surgical History:   Procedure Laterality Date    OTHER SURGICAL HISTORY  2003    Vocal cord nodule resection x2 in 2003/2004    OTHER SURGICAL HISTORY      No surgery, traction in hospital x 7 weeks, age 9    OTHER SURGICAL HISTORY  1993    Varicocelectomy    VASECTOMY      Late 1990s        reports that he has never smoked. He has never been exposed to tobacco smoke. He has never used smokeless tobacco. He reports current alcohol use of about 4.0 standard drinks of alcohol per week. He reports that he does not use drugs.    Review of Systems  Review of Systems:  General: No weight loss, fatigue, anorexia, insomnia, fever, chills.  Eyes: No vision loss, double vision, blurred vision, drainage, or eye pain.  ENT: Positive pharyngitis, no dry mouth, positive nasal congestion and sinus pressure, no ear pain  Cardiac: No chest pain, palpitations, syncope, near syncope.  Pulmonary:  No shortness of breath, cough, hemoptysis  Heme/lymph: No swollen glands, fever, bleeding  Musculoskeletal: No limb pain, joint pain, joint swelling.  Skin: No rashes  Neuro: No numbness, tingling, headaches                                 Objective    Vitals:    10/17/24 0930   BP: 106/66   Pulse: 60   Resp: 16   Temp: 36.7 °C (98.1 °F)   SpO2: 97%   Weight: 89.8 kg (198 lb)     No LMP for male patient.    Physical Exam  Physical Exam:  General: Vital noted, no distress. Afebrile  EENT: Eyes  unremarkable, Pupils PERRLA, EOMs intact. TMs unremarkable. Posterior oropharynx with erythema and edema. Uvula in the midline and non-edematous. No PTA. No retropharyngeal mass. No Jett's angina.  Neck: Supple. No meningismus through full range of motion. No lymphadenopathy.  Cardiac: Regular rate and rhythm, no murmur  Pulmonary: Lungs clear bilaterally with good aeration. No adventitious breath sounds.  Skin: No rashes  Neuro: No focal neurologic deficits, NIH score of 0.      Procedures    Point of Care Test & Imaging Results from this visit  Results for orders placed or performed in visit on 10/17/24   POCT Influenza A/B manually resulted   Result Value Ref Range    POC Rapid Influenza A Negative Negative    POC Rapid Influenza B Negative Negative   POCT Covid-19 Rapid Antigen   Result Value Ref Range    POC ETHAN-COV-2 AG  Presumptive negative test for SARS-CoV-2 (no antigen detected)     Presumptive negative test for SARS-CoV-2 (no antigen detected)   POCT rapid strep A manually resulted   Result Value Ref Range    POC Rapid Strep Negative Negative      No results found.    Diagnostic study results (if any) were reviewed by KAREEM Vasquez.    Assessment/Plan   Allergies, medications, history, and pertinent labs/EKGs/Imaging reviewed by KAREEM Vasquez.     Medical Decision Making  Testing: rapid strep, covid, flu  Treatment: Amoxicillin prescribed  Differential: 1) uri, 2) sinusitis , 3) covid  Plan: Discussed differential with the pateint. Patient will follow up with the PCP in the next 2-3 days. Return for any worsening symptoms or go to the ER for further evaluation. Patient understands return precautions and discharege insturctions.  Impression:   1) sinusitis      Orders and Diagnoses  Diagnoses and all orders for this visit:  Acute non-recurrent maxillary sinusitis  -     amoxicillin (Amoxil) 875 mg tablet; Take 1 tablet (875 mg) by mouth 2 times a day for 10 days.  Viral upper  respiratory tract infection  -     POCT Influenza A/B manually resulted  -     POCT Covid-19 Rapid Antigen  -     POCT rapid strep A manually resulted  COVID-19 virus RNA not detected      Medical Admin Record      Patient disposition: Home    Electronically signed by ALVIN Vasquez-KONG  9:49 AM

## 2025-01-14 DIAGNOSIS — H40.013 OPEN ANGLE WITH BORDERLINE FINDINGS, LOW RISK, BILATERAL: ICD-10-CM

## 2025-01-14 RX ORDER — LATANOPROST 50 UG/ML
1 SOLUTION/ DROPS OPHTHALMIC NIGHTLY
Qty: 7.5 ML | Refills: 2 | Status: SHIPPED | OUTPATIENT
Start: 2025-01-14

## 2025-02-13 ENCOUNTER — APPOINTMENT (OUTPATIENT)
Dept: OPHTHALMOLOGY | Facility: CLINIC | Age: 70
End: 2025-02-13
Payer: MEDICARE

## 2025-03-18 ENCOUNTER — LAB (OUTPATIENT)
Dept: LAB | Facility: HOSPITAL | Age: 70
End: 2025-03-18
Payer: MEDICARE

## 2025-03-18 DIAGNOSIS — Z00.00 ENCOUNTER FOR GENERAL ADULT MEDICAL EXAMINATION WITHOUT ABNORMAL FINDINGS: Primary | ICD-10-CM

## 2025-03-18 DIAGNOSIS — Z00.00 WELLNESS EXAMINATION: ICD-10-CM

## 2025-03-18 LAB
25(OH)D3 SERPL-MCNC: 55 NG/ML (ref 30–100)
ALBUMIN SERPL BCP-MCNC: 4.3 G/DL (ref 3.4–5)
ALP SERPL-CCNC: 55 U/L (ref 33–136)
ALT SERPL W P-5'-P-CCNC: 15 U/L (ref 10–52)
ANION GAP SERPL CALC-SCNC: 9 MMOL/L (ref 10–20)
APPEARANCE UR: CLEAR
AST SERPL W P-5'-P-CCNC: 28 U/L (ref 9–39)
BASOPHILS # BLD AUTO: 0.05 X10*3/UL (ref 0–0.1)
BASOPHILS NFR BLD AUTO: 1.1 %
BILIRUB SERPL-MCNC: 1 MG/DL (ref 0–1.2)
BILIRUB UR STRIP.AUTO-MCNC: NEGATIVE MG/DL
BUN SERPL-MCNC: 16 MG/DL (ref 6–23)
CALCIUM SERPL-MCNC: 9.2 MG/DL (ref 8.6–10.3)
CHLORIDE SERPL-SCNC: 103 MMOL/L (ref 98–107)
CHOLEST SERPL-MCNC: 223 MG/DL (ref 0–199)
CHOLESTEROL/HDL RATIO: 3.2
CO2 SERPL-SCNC: 29 MMOL/L (ref 21–32)
COLOR UR: COLORLESS
CREAT SERPL-MCNC: 0.78 MG/DL (ref 0.5–1.3)
CRP SERPL HS-MCNC: 0.5 MG/L
EGFRCR SERPLBLD CKD-EPI 2021: >90 ML/MIN/1.73M*2
EOSINOPHIL # BLD AUTO: 0.11 X10*3/UL (ref 0–0.7)
EOSINOPHIL NFR BLD AUTO: 2.4 %
ERYTHROCYTE [DISTWIDTH] IN BLOOD BY AUTOMATED COUNT: 13.3 % (ref 11.5–14.5)
EST. AVERAGE GLUCOSE BLD GHB EST-MCNC: 117 MG/DL
GLUCOSE SERPL-MCNC: 92 MG/DL (ref 74–99)
GLUCOSE UR STRIP.AUTO-MCNC: NORMAL MG/DL
HBA1C MFR BLD: 5.7 %
HCT VFR BLD AUTO: 43.9 % (ref 41–52)
HDLC SERPL-MCNC: 69.3 MG/DL
HGB BLD-MCNC: 14.4 G/DL (ref 13.5–17.5)
IMM GRANULOCYTES # BLD AUTO: 0.01 X10*3/UL (ref 0–0.7)
IMM GRANULOCYTES NFR BLD AUTO: 0.2 % (ref 0–0.9)
KETONES UR STRIP.AUTO-MCNC: NEGATIVE MG/DL
LDLC SERPL CALC-MCNC: 133 MG/DL
LEUKOCYTE ESTERASE UR QL STRIP.AUTO: NEGATIVE
LYMPHOCYTES # BLD AUTO: 1.44 X10*3/UL (ref 1.2–4.8)
LYMPHOCYTES NFR BLD AUTO: 31.9 %
MCH RBC QN AUTO: 32.7 PG (ref 26–34)
MCHC RBC AUTO-ENTMCNC: 32.8 G/DL (ref 32–36)
MCV RBC AUTO: 100 FL (ref 80–100)
MONOCYTES # BLD AUTO: 0.43 X10*3/UL (ref 0.1–1)
MONOCYTES NFR BLD AUTO: 9.5 %
NEUTROPHILS # BLD AUTO: 2.47 X10*3/UL (ref 1.2–7.7)
NEUTROPHILS NFR BLD AUTO: 54.9 %
NITRITE UR QL STRIP.AUTO: NEGATIVE
NON HDL CHOLESTEROL: 154 MG/DL (ref 0–149)
NRBC BLD-RTO: 0 /100 WBCS (ref 0–0)
PH UR STRIP.AUTO: 7 [PH]
PLATELET # BLD AUTO: 259 X10*3/UL (ref 150–450)
POTASSIUM SERPL-SCNC: 4.3 MMOL/L (ref 3.5–5.3)
PROT SERPL-MCNC: 7 G/DL (ref 6.4–8.2)
PROT UR STRIP.AUTO-MCNC: NEGATIVE MG/DL
PSA SERPL-MCNC: 3.02 NG/ML
RBC # BLD AUTO: 4.41 X10*6/UL (ref 4.5–5.9)
RBC # UR STRIP.AUTO: NEGATIVE MG/DL
SODIUM SERPL-SCNC: 137 MMOL/L (ref 136–145)
SP GR UR STRIP.AUTO: 1.01
TRIGL SERPL-MCNC: 102 MG/DL (ref 0–149)
TSH SERPL-ACNC: 0.87 MIU/L (ref 0.44–3.98)
UROBILINOGEN UR STRIP.AUTO-MCNC: NORMAL MG/DL
VLDL: 20 MG/DL (ref 0–40)
WBC # BLD AUTO: 4.5 X10*3/UL (ref 4.4–11.3)

## 2025-03-18 PROCEDURE — 80053 COMPREHEN METABOLIC PANEL: CPT

## 2025-03-18 PROCEDURE — 86141 C-REACTIVE PROTEIN HS: CPT

## 2025-03-18 PROCEDURE — 82306 VITAMIN D 25 HYDROXY: CPT

## 2025-03-18 PROCEDURE — 85025 COMPLETE CBC W/AUTO DIFF WBC: CPT

## 2025-03-18 PROCEDURE — 84443 ASSAY THYROID STIM HORMONE: CPT

## 2025-03-18 PROCEDURE — 83036 HEMOGLOBIN GLYCOSYLATED A1C: CPT

## 2025-03-18 PROCEDURE — 36415 COLL VENOUS BLD VENIPUNCTURE: CPT

## 2025-03-18 PROCEDURE — 80061 LIPID PANEL: CPT

## 2025-03-18 PROCEDURE — 81003 URINALYSIS AUTO W/O SCOPE: CPT

## 2025-03-18 PROCEDURE — 84153 ASSAY OF PSA TOTAL: CPT

## 2025-03-24 ASSESSMENT — PROMIS GLOBAL HEALTH SCALE
CARRYOUT_SOCIAL_ACTIVITIES: EXCELLENT
RATE_SOCIAL_SATISFACTION: EXCELLENT
RATE_GENERAL_HEALTH: VERY GOOD
RATE_PHYSICAL_HEALTH: VERY GOOD
RATE_QUALITY_OF_LIFE: VERY GOOD
CARRYOUT_PHYSICAL_ACTIVITIES: COMPLETELY
EMOTIONAL_PROBLEMS: NEVER
RATE_AVERAGE_PAIN: 0
RATE_MENTAL_HEALTH: EXCELLENT

## 2025-03-25 ENCOUNTER — APPOINTMENT (OUTPATIENT)
Dept: PRIMARY CARE | Facility: CLINIC | Age: 70
End: 2025-03-25
Payer: COMMERCIAL

## 2025-03-25 VITALS
SYSTOLIC BLOOD PRESSURE: 110 MMHG | BODY MASS INDEX: 26.75 KG/M2 | DIASTOLIC BLOOD PRESSURE: 58 MMHG | HEART RATE: 51 BPM | HEIGHT: 72 IN | WEIGHT: 197.5 LBS | OXYGEN SATURATION: 99 %

## 2025-03-25 DIAGNOSIS — R73.01 IMPAIRED FASTING GLUCOSE: ICD-10-CM

## 2025-03-25 DIAGNOSIS — R60.0 EDEMA OF LEFT LOWER EXTREMITY: ICD-10-CM

## 2025-03-25 DIAGNOSIS — Z00.00 MEDICARE ANNUAL WELLNESS VISIT, INITIAL: Primary | ICD-10-CM

## 2025-03-25 DIAGNOSIS — R97.20 ELEVATED PSA: ICD-10-CM

## 2025-03-25 DIAGNOSIS — D12.6 TUBULAR ADENOMA OF COLON: ICD-10-CM

## 2025-03-25 DIAGNOSIS — E55.9 VITAMIN D DEFICIENCY: ICD-10-CM

## 2025-03-25 DIAGNOSIS — Z00.00 WELLNESS EXAMINATION: ICD-10-CM

## 2025-03-25 DIAGNOSIS — H40.013 OPEN ANGLE WITH BORDERLINE FINDINGS AND LOW GLAUCOMA RISK IN BOTH EYES: ICD-10-CM

## 2025-03-25 PROBLEM — R93.1 AGATSTON CORONARY ARTERY CALCIUM SCORE LESS THAN 100: Status: ACTIVE | Noted: 2025-03-25

## 2025-03-25 PROBLEM — I44.0 FIRST DEGREE AV BLOCK: Status: ACTIVE | Noted: 2025-03-25

## 2025-03-25 PROBLEM — Z04.9 CONDITION NOT FOUND: Status: RESOLVED | Noted: 2023-04-07 | Resolved: 2025-03-25

## 2025-03-25 PROBLEM — D53.9 MACROCYTIC ANEMIA: Status: RESOLVED | Noted: 2023-04-07 | Resolved: 2025-03-25

## 2025-03-25 PROCEDURE — 93000 ELECTROCARDIOGRAM COMPLETE: CPT | Performed by: INTERNAL MEDICINE

## 2025-03-25 PROCEDURE — G0438 PPPS, INITIAL VISIT: HCPCS | Performed by: INTERNAL MEDICINE

## 2025-03-25 PROCEDURE — 3008F BODY MASS INDEX DOCD: CPT | Performed by: INTERNAL MEDICINE

## 2025-03-25 PROCEDURE — 1159F MED LIST DOCD IN RCRD: CPT | Performed by: INTERNAL MEDICINE

## 2025-03-25 PROCEDURE — UHSPHYS PR UH SELECT PHYSICAL: Performed by: INTERNAL MEDICINE

## 2025-03-25 RX ORDER — VIT C/E/ZN/COPPR/LUTEIN/ZEAXAN 250MG-90MG
25 CAPSULE ORAL DAILY
Start: 2025-03-25 | End: 2026-03-25

## 2025-03-25 ASSESSMENT — ENCOUNTER SYMPTOMS
OCCASIONAL FEELINGS OF UNSTEADINESS: 0
DEPRESSION: 0
LOSS OF SENSATION IN FEET: 0

## 2025-03-25 NOTE — PROGRESS NOTES
Antonio Collins is a 69 y.o. year old here for a Medicare Annual Wellness Visit     Health Risk Assessment  In general, health is:  Very good     Concerns with balance:  No     Concerns with teeth or dentures:  No     Concerns with sexual function:  No     Felt anxious, stressed, angry, irritable, lonely, isolated, or had thoughts of hurting themself:  No     Has little interest or pleasure in doing things:  No     Bothered by feeling down, depressed, or hopeless:  No     Needs help with grocery shopping, cooking, housework, bathing, grooming, dressing, eating, sitting or standing, walking, using the toilet, handling finances, taking medications, using the telephone, or driving:  No     Following safety precautions in the home environment and vehicle: removed throw rugs from floors, installed grab bars in the bathroom, handrails in stairwells, having adequate lighting, wearing seatbelt at all times?:  Yes     Smokes cigarettes, vapes, or chew tobacco:  No     Eats healthy foods including fruits, vegetables, whole grains, and fiber-rich foods:  Daily     Number of days per week engages in exercise:  2 days     Average alcohol consumption: None        Current Providers  Specialists: I have reviewed specialist-related care of the patient in the medical record.     Medical/Family history review  Reviewed and updated problem list, medical/surgical/family/social history, medications, and allergies.     Opioid use review  Patient use of opioids:  No           Depression screening  Depression Screening PHQ-2 Score   2/14/2023 0         Depression screening tool completed and reviewed. Based on score and interview, patient is not at risk for depression. Screening tool discussed with patient, and I recommended no further intervention at this time.     Cognitive screening  Mini Cog Score:  5/5     Cognitive screening reviewed and plan:  yes     Functional Observation  Was the patient's timed Up & Go test unsteady or >= 12  seconds?  No     Advance Care Planning  End of Life planning discussed, including patient's advanced directive wishes:  Yes (spouse)    Vision and Hearing assessment  Visual acuity (required for Welcome to Medicare):  Ophthalmology  Hearing Evaluation:  Hearing loss and sees audiology    ====================================================    /58 (BP Location: Left arm, Patient Position: Sitting, BP Cuff Size: Adult)   Pulse 51   Ht 1.829 m (6')   Wt 89.6 kg (197 lb 8 oz)   SpO2 99%   BMI 26.79 kg/m²     Chief Complaint   Patient presents with    Annual Exam     Medicare Wellness Visit       Wellness exam/physical ([facility])    Impaired fasting glucose   Overweight (BMI 26.8)  Hemoglobin A1c has improved to 5.7%.  Current weight is 199 pounds, goal weight 185 pounds.  He starts work with new  next week.  He has been focusing on proper diet     History of elevated PSA (with negative prostate biopsy).   Current PSA is normal at 3.0.  Next appointment with Dr. Pan in 8/2025     Chronic left lower extremity edema, venous insufficiency   Evaluation previously by vascular surgery.  He is wearing support socks which has been effective.  Follow-up with Olya Benites (vascular surgery) in June    Vitamin D deficiency  Vitamin D level is better, currently on 1000 IU daily    Eczema  History of actinic keratoses  Management with dermatology, Dr. Tse     History of colon polyps  Next screening colonoscopy due in 6/2027     Glaucoma  Pressure stable on current regimen for ophthalmology    Health maintenance  Exercise: Working with new  starting next week.  Currently exercising twice a week with weights and cardio, also walking daily.  Colon cancer screening: Next colonoscopy in 6/2027  Ophthalmology: Up-to-date with Dr. Plaza  Dentistry: Up-to-date  Dermatology: Up-to-date        Review of Systems   Constitutional: Negative for malaise/fatigue.   HENT:  Negative for hearing loss and sore throat.     Eyes:  Negative for blurred vision and visual disturbance.   Cardiovascular:  Negative for chest pain, dyspnea on exertion and palpitations.   Respiratory:  Negative for cough, shortness of breath and wheezing.    Skin:  Negative for rash.   Musculoskeletal:  Negative for back pain, joint pain and muscle cramps.   Gastrointestinal:  Negative for abdominal pain, constipation, diarrhea and heartburn.   Genitourinary:  Negative for dysuria, frequency and urgency.   Neurological:  Negative for dizziness, headaches, light-headedness and numbness.   Psychiatric/Behavioral:  Negative for depression. The patient is not nervous/anxious.         Physical Exam  Vitals reviewed.   Constitutional:       Appearance: Normal appearance.   HENT:      Head: Normocephalic.      Right Ear: Tympanic membrane normal.      Left Ear: Tympanic membrane normal.      Mouth/Throat:      Mouth: Mucous membranes are moist.      Pharynx: No oropharyngeal exudate or posterior oropharyngeal erythema.   Eyes:      Conjunctiva/sclera: Conjunctivae normal.   Neck:      Vascular: No carotid bruit.   Cardiovascular:      Rate and Rhythm: Normal rate and regular rhythm.      Heart sounds: No murmur heard.  Pulmonary:      Effort: Pulmonary effort is normal.      Breath sounds: Normal breath sounds. No wheezing or rales.   Abdominal:      General: Bowel sounds are normal.      Palpations: Abdomen is soft.      Tenderness: There is no abdominal tenderness.   Genitourinary:     Comments: Prostate exam deferred to Dr. Pan  Musculoskeletal:      Comments: Bilateral knees and hips with intact range of motion  Left greater than right lower extremity edema   Lymphadenopathy:      Cervical: No cervical adenopathy.   Skin:     General: Skin is warm.   Neurological:      General: No focal deficit present.      Mental Status: He is alert and oriented to person, place, and time.   Psychiatric:         Mood and Affect: Mood normal.           Assessment and  Plan    Medicare annual wellness visit (initial)  Regular exercise with goal of 120-150 minutes/week recommended  Well-balanced diet rich in fruits, vegetables, fiber, lean protein recommended  Continued routine follow-up with dentistry recommended  Providing copy of advance directives (DURABLE POWER OF  for healthcare) to place in chart recommended  Information regarding RSV vaccine provided.    ==================================    Wellness exam/physical ( [facility])  Regular exercise with goal of 120-150 minutes/week recommended  Well-balanced diet rich in fruits, vegetables, fiber, lean protein recommended  Continued routine follow-up with dentistry recommended  Providing copy of advance directives (DURABLE POWER OF  for healthcare) to place in chart recommended  Information regarding RSV vaccine provided.  Referral to nutrition provided  Referral to T3 training assessment    Impaired fasting glucose (Improved hemoglobin A1c 5.7%)  Overweight (BMI 26.8)  Low carbohydrate/low sugar diet recommended  Increased exercise routine to initiate next week with new   Patient goal for weight is 185 pounds     History of elevated PSA (with negative prostate biopsy).  Current PSA normal at 3.0  Continue follow-up with urology, next appointment with Dr. Pan in 8/2025     Chronic left lower extremity edema, venous insufficiency (completed evaluation per vascular surgery)  Use support hose as needed, maintain low-salt diet, elevate legs as needed  Follow-up with Olya Benites (vascular surgery) in June    Vitamin D deficiency (improved level)  Continue over-the-counter vitamin D 1000 IU daily     Eczema  History of actinic keratoses  Continue moisturizing lotion  Continue fluocinonide 0.05% cream up to twice a day as needed  Continue routine follow-up with dermatology, Dr. Tse     History of colon polyps  Next screening colonoscopy due in 6/2027     Glaucoma  Continue timolol and  latanoprost  Continue routine follow-up with ophthalmology, Dr. Tobar     Follow-up  Wellness exam/physical in 1 year, on/after 3/26/2026  (Fasting lab work will be ordered to complete 3 to 7 days prior)    Fly Loera MD

## 2025-03-25 NOTE — PATIENT INSTRUCTIONS
Wellness exam/physical ([facility])  Regular exercise with goal of 120-150 minutes/week recommended  Well-balanced diet rich in fruits, vegetables, fiber, lean protein recommended  Continued routine follow-up with dentistry recommended  Providing copy of advance directives (DURABLE POWER OF  for healthcare) to place in chart recommended  Information regarding RSV vaccine provided.  Referral to nutrition provided  Referral to T3 training assessment    Impaired fasting glucose (Improved hemoglobin A1c 5.7%)  Overweight (BMI 26.8)  Low carbohydrate/low sugar diet recommended  Increased exercise routine to initiate next week with new   Patient goal for weight is 185 pounds     History of elevated PSA (with negative prostate biopsy).  Current PSA normal at 3.0  Continue follow-up with urology, next appointment with Dr. Pan in 8/2025     Chronic left lower extremity edema, venous insufficiency (completed evaluation per vascular surgery)  Use support hose as needed, maintain low-salt diet, elevate legs as needed  Follow-up with Olya Benites (vascular surgery) in June    Vitamin D deficiency (improved level)  Continue over-the-counter vitamin D 1000 IU daily     Eczema  History of actinic keratoses  Continue moisturizing lotion  Continue fluocinonide 0.05% cream up to twice a day as needed  Continue routine follow-up with dermatology, Dr. Tse     History of colon polyps  Next screening colonoscopy due in 6/2027     Glaucoma  Continue timolol and latanoprost  Continue routine follow-up with ophthalmology, Dr. Tobar     Follow-up  Wellness exam/physical in 1 year, on/after 3/26/2026  (Fasting lab work will be ordered to complete 3 to 7 days prior)

## 2025-03-27 DIAGNOSIS — Z00.00 WELLNESS EXAMINATION: Primary | ICD-10-CM

## 2025-03-27 ASSESSMENT — ENCOUNTER SYMPTOMS
BACK PAIN: 0
DYSPNEA ON EXERTION: 0
SORE THROAT: 0
COUGH: 0
HEADACHES: 0
DIZZINESS: 0
DEPRESSION: 0
WHEEZING: 0
HEARTBURN: 0
ABDOMINAL PAIN: 0
LIGHT-HEADEDNESS: 0
NUMBNESS: 0
PALPITATIONS: 0
BLURRED VISION: 0
DIARRHEA: 0
CONSTIPATION: 0
DYSURIA: 0
FREQUENCY: 0
MUSCLE CRAMPS: 0
NERVOUS/ANXIOUS: 0
SHORTNESS OF BREATH: 0

## 2025-03-31 NOTE — PROGRESS NOTES
"AUDIOLOGY ADULT AUDIOMETRIC EVALUATION      Name:  Antonio Collins   :  1955  Age:  69 y.o.  Date of Evaluation:  2025    Time: ***-***    IMPRESSIONS     {Adult Impressions:45719}  {impressions wrs:45542}  {oaes (Optional):31050}  {tymps:91986}     Today's test results are {AUD IMPRESSIONS:87769}.     Amplification needs: {Amplification Needs:87157}    RECOMMENDATIONS     {Adult Recommendations:65572}    HISTORY     Reason for visit:  Mr. Collins is seen today for a follow-up audiologic evaluation due to known hearing loss. His PCP is Fly Loera MD. Previous audio was performed on 24 and revealed hearing levels within normal limits 125-2000 Hz falling to a mild to moderate sensorineural hearing loss 6498-4924 Hz in the right ear and hearing levels within normal limits 125-1000 Hz falling to a mild sloping to moderate sensorineural hearing loss 5324-2991 Hz in the left ear. History obtained from patient report and chart review.     Change in Hearing: {duncan y/n:18752}  Difficult listening environments: ***  Tinnitus: {duncan y/n:01902} {duncan tinnitus (Optional):82215}  Otalgia: {duncan y/n:77841::\"denied\"}  Aural Pressure/Fullness: {duncan y/n:57573::\"denied\"}  Recent Ear Infections/Illness: {duncan y/n:05932::\"denied\"}  Otorrhea: {duncan y/n:43000::\"denied\"}  Dizziness: {duncan y/n dizzy:34023::\"denied\"}  History of Ear Surgeries: {y/n surgeries:17066::\"denied\"}  History of Noise Exposure: {duncan noise exposure:94587::\"Denied\"}  Family History of Hearing Loss: {duncan fam hx hl:20420::\"Denied\"}  Hearing Aid Use: {duncan HA use:33519::\"none\"}  Other Significant History: {duncan y/n:28326::\"denied\"}  Falls within the last year: {duncan y/n falls:17896::\"denied\"}    EVALUATION     See scanned audiogram in \"Media\"     TEST RESULTS     Otoscopic Evaluation:  Right Ear: {otoscopy:18080}  Left Ear: {otoscopy:48100}    Tympanometry (226 Hz):  Right Ear: {tymp results:64439}.   Left Ear: {tymp results:58322}.     Acoustic Reflexes:   Right " "Ear: {duncan ART:57260}  Left Ear: {duncan ART:11886}    Distortion Product Otoacoustic Emissions:  Right Ear: {duncan DPOAEs:05473::\"Did not test.\"}  Left Ear:  {duncan DPOAEs:04341::\"Did not test.\"}  Present OAEs suggest normal or near cochlear outer hair cell function for corresponding frequency region(s). Absent OAEs with normal middle ear function can be consistent with some degree of hearing loss. Assessment of cochlear outer hair cell function may be impacted by outer or middle ear function.    Test technique:  Pure Tone Audiometry via {transducer:51827}  Reliability: {kmreliability:54179}    Pure Tone Audiometry:    Right Ear: {results:77717}  Left Ear: {results:65985}    Speech Audiometry:   Right Ear:  Speech Reception Threshold (SRT) was obtained at *** dB HL. Word Recognition scores were {DESC; EXCELLENT/GOOD/FAIR:19372} (***%) in quiet when words were presented at *** dB HL. These results are based on {Word List:39737}.   Left Ear:  Speech Reception Threshold (SRT) was obtained at *** dB HL. Word Recognition scores were {DESC; EXCELLENT/GOOD/FAIR:83877} (***%) in quiet when words were presented at *** dB HL. These results are based on {Word List:36032}.     Comparison of today's results with previous test results: *** since last evaluation on 4/2/24.    ***     PATIENT EDUCATION     Discussed results and recommendations with Mr. Collins{duncan results discussion (Optional):57133}. Questions were addressed and the patient was encouraged to contact our department at [phone] should concerns arise.       Angelika Engle, Christopher, CCC-A  Clinical Audiologist    Degree of   Hearing Sensitivity dB Range   Within Normal Limits (WNL) 0 - 20   Slight 25   Mild 26 - 40   Moderate 41 - 55   Moderately-Severe 56 - 70   Severe 71 - 90   Profound 91 +     Key   CHL Conductive Hearing Loss   ECV Ear Canal Volume   HA Hearing Aid   NIHL Noise-Induced Hearing Loss   PTA Pure Tone Average   SNHL Sensorineural Hearing Loss   TM Tympanic " Membrane   WNL Within Normal Limits

## 2025-04-01 ENCOUNTER — APPOINTMENT (OUTPATIENT)
Dept: PRIMARY CARE | Facility: CLINIC | Age: 70
End: 2025-04-01
Payer: COMMERCIAL

## 2025-04-04 ENCOUNTER — APPOINTMENT (OUTPATIENT)
Dept: AUDIOLOGY | Facility: CLINIC | Age: 70
End: 2025-04-04
Payer: COMMERCIAL

## 2025-04-04 NOTE — PROGRESS NOTES
"AUDIOLOGY ADULT AUDIOMETRIC EVALUATION      Name:  Antonio Collins   :  1955  Age:  69 y.o.  Date of Evaluation:  2025    Time: ***-***    IMPRESSIONS     {Adult Impressions:02203}  {impressions wrs:15134}  {oaes (Optional):24935}  {tymps:57279}     Today's test results are {AUD IMPRESSIONS:33203}.     Amplification needs: {Amplification Needs:33378}    RECOMMENDATIONS     {Adult Recommendations:15789}    HISTORY     Reason for visit:  Mr. Collins is seen today for a follow-up audiologic evaluation due to known hearing loss. His PCP is Fly Loera MD. Previous audio was performed on 24 and revealed hearing levels within normal limits 125-2000 Hz falling to a mild to moderate sensorineural hearing loss 7366-8869 Hz in the right ear and hearing levels within normal limits 125-1000 Hz falling to a mild sloping to moderate sensorineural hearing loss 3026-5942 Hz in the left ear. History obtained from patient report and chart review.     Change in Hearing: {duncan y/n:21025}  Difficult listening environments: ***  Tinnitus: {duncan y/n:87458} {duncan tinnitus (Optional):67507}  Otalgia: {duncan y/n:19482::\"denied\"}  Aural Pressure/Fullness: {duncan y/n:66749::\"denied\"}  Recent Ear Infections/Illness: {duncan y/n:18373::\"denied\"}  Otorrhea: {duncan y/n:30475::\"denied\"}  Dizziness: {duncan y/n dizzy:65098::\"denied\"}  History of Ear Surgeries: {y/n surgeries:76526::\"denied\"}  History of Noise Exposure: {duncan noise exposure:29926::\"Denied\"}  Family History of Hearing Loss: {duncan fam hx hl:98831::\"Denied\"}  Hearing Aid Use: {duncan HA use:22701::\"none\"}  Other Significant History: {duncan y/n:68026::\"denied\"}  Falls within the last year: {duncan y/n falls:00912::\"denied\"}    EVALUATION     See scanned audiogram in \"Media\"     TEST RESULTS     Otoscopic Evaluation:  Right Ear: {otoscopy:35840}  Left Ear: {otoscopy:57917}    Tympanometry (226 Hz):  Right Ear: {tymp results:14409}.   Left Ear: {tymp results:03611}.     Acoustic Reflexes:   Right Ear: " "{duncan ART:74757}  Left Ear: {duncan ART:55010}    Distortion Product Otoacoustic Emissions:  Right Ear: {duncan DPOAEs:20040::\"Did not test.\"}  Left Ear:  {duncan DPOAEs:75772::\"Did not test.\"}  Present OAEs suggest normal or near cochlear outer hair cell function for corresponding frequency region(s). Absent OAEs with normal middle ear function can be consistent with some degree of hearing loss. Assessment of cochlear outer hair cell function may be impacted by outer or middle ear function.    Test technique:  Pure Tone Audiometry via {transducer:45840}  Reliability: {kmreliability:46586}    Pure Tone Audiometry:    Right Ear: {results:52333}  Left Ear: {results:22561}    Speech Audiometry:   Right Ear:  Speech Reception Threshold (SRT) was obtained at *** dB HL. Word Recognition scores were {DESC; EXCELLENT/GOOD/FAIR:88721} (***%) in quiet when words were presented at *** dB HL. These results are based on {Word List:61725}.   Left Ear:  Speech Reception Threshold (SRT) was obtained at *** dB HL. Word Recognition scores were {DESC; EXCELLENT/GOOD/FAIR:36876} (***%) in quiet when words were presented at *** dB HL. These results are based on {Word List:95753}.     Comparison of today's results with previous test results: *** since last evaluation on 4/2/24.    ***     PATIENT EDUCATION     Discussed results and recommendations with Mr. Collins{duncan results discussion (Optional):28880}. Questions were addressed and the patient was encouraged to contact our department at [phone] should concerns arise.       Angelika Engle, Christopher, CCC-A  Clinical Audiologist    Degree of   Hearing Sensitivity dB Range   Within Normal Limits (WNL) 0 - 20   Slight 25   Mild 26 - 40   Moderate 41 - 55   Moderately-Severe 56 - 70   Severe 71 - 90   Profound 91 +     Key   CHL Conductive Hearing Loss   ECV Ear Canal Volume   HA Hearing Aid   NIHL Noise-Induced Hearing Loss   PTA Pure Tone Average   SNHL Sensorineural Hearing Loss   TM Tympanic " Membrane   WNL Within Normal Limits

## 2025-04-08 ENCOUNTER — OFFICE VISIT (OUTPATIENT)
Dept: URGENT CARE | Age: 70
End: 2025-04-08
Payer: MEDICARE

## 2025-04-08 ENCOUNTER — APPOINTMENT (OUTPATIENT)
Dept: AUDIOLOGY | Facility: CLINIC | Age: 70
End: 2025-04-08
Payer: COMMERCIAL

## 2025-04-08 VITALS
TEMPERATURE: 97.6 F | SYSTOLIC BLOOD PRESSURE: 109 MMHG | OXYGEN SATURATION: 96 % | HEART RATE: 53 BPM | DIASTOLIC BLOOD PRESSURE: 72 MMHG | RESPIRATION RATE: 18 BRPM

## 2025-04-08 DIAGNOSIS — B34.8 RHINOVIRUS INFECTION: Primary | ICD-10-CM

## 2025-04-08 DIAGNOSIS — J02.9 SORE THROAT: ICD-10-CM

## 2025-04-08 LAB
POC HUMAN RHINOVIRUS PCR: POSITIVE
POC INFLUENZA A VIRUS PCR: NEGATIVE
POC INFLUENZA B VIRUS PCR: NEGATIVE
POC RESPIRATORY SYNCYTIAL VIRUS PCR: NEGATIVE
POC STREPTOCOCCUS PYOGENES (GROUP A STREP) PCR: NEGATIVE

## 2025-04-08 PROCEDURE — 87651 STREP A DNA AMP PROBE: CPT

## 2025-04-08 PROCEDURE — 99213 OFFICE O/P EST LOW 20 MIN: CPT

## 2025-04-08 PROCEDURE — 1036F TOBACCO NON-USER: CPT

## 2025-04-08 PROCEDURE — 1159F MED LIST DOCD IN RCRD: CPT

## 2025-04-08 PROCEDURE — 1160F RVW MEDS BY RX/DR IN RCRD: CPT

## 2025-04-08 NOTE — PROGRESS NOTES
Subjective   Patient ID: Antonio Collins is a 69 y.o. male. They present today with a chief complaint of Sore Throat last night.       Past Medical History  Allergies as of 04/08/2025    (No Known Allergies)       (Not in a hospital admission)       Past Medical History:   Diagnosis Date    Other specified health status 05/13/2019    No pertinent past medical history    Polyp of vocal cord and larynx     Polyp, vocal cord       Past Surgical History:   Procedure Laterality Date    OTHER SURGICAL HISTORY  2003    Vocal cord nodule resection x2 in 2003/2004    OTHER SURGICAL HISTORY      No surgery, traction in hospital x 7 weeks, age 9    OTHER SURGICAL HISTORY  1993    Varicocelectomy    VASECTOMY      Late 1990s        reports that he has never smoked. He has never been exposed to tobacco smoke. He has never used smokeless tobacco. He reports current alcohol use of about 4.0 standard drinks of alcohol per week. He reports that he does not use drugs.    Review of Systems  Review of Systems                               Objective    Vitals:    04/08/25 0814   BP: 109/72   Pulse: 53   Resp: 18   Temp: 36.4 °C (97.6 °F)   SpO2: 96%     No LMP for male patient.    Physical Exam  Vitals reviewed.   Constitutional:       General: He is not in acute distress.  HENT:      Head: Normocephalic and atraumatic.      Right Ear: Tympanic membrane and ear canal normal. No tenderness.      Left Ear: Tympanic membrane and ear canal normal. No tenderness.      Nose: Nose normal. No congestion.      Mouth/Throat:      Mouth: Mucous membranes are moist.      Pharynx: Oropharynx is clear. Uvula midline. Posterior oropharyngeal erythema present. No pharyngeal swelling or oropharyngeal exudate.   Eyes:      Extraocular Movements: Extraocular movements intact.      Conjunctiva/sclera: Conjunctivae normal.      Pupils: Pupils are equal, round, and reactive to light.   Cardiovascular:      Rate and Rhythm: Normal rate and regular rhythm.       Heart sounds: No murmur heard.  Pulmonary:      Effort: Pulmonary effort is normal.      Breath sounds: Normal breath sounds. No decreased breath sounds, wheezing, rhonchi or rales.   Skin:     General: Skin is warm.   Neurological:      Mental Status: He is alert and oriented to person, place, and time.   Psychiatric:         Mood and Affect: Mood normal.         Behavior: Behavior normal.             Point of Care Test & Imaging Results from this visit  Results for orders placed or performed in visit on 04/08/25   POCT SPOTFIRE R/ST Panel Mini w/Strep A ([facility]) manually resulted   Result Value Ref Range    POC Group A Strep, PCR Negative Negative    POC Respiratory Syncytial Virus PCR Negative Negative    POC Influenza A Virus PCR Negative Negative    POC Influenza B Virus PCR Negative Negative    POC Human Rhinovirus PCR Positive (A) Negative      Imaging  No results found.    Cardiology, Vascular, and Other Imaging  No other imaging results found for the past 2 days      Diagnostic study results (if any) were reviewed by Denis Goode PA-C.    Assessment/Plan   Allergies, medications, history, and pertinent labs/EKGs/Imaging reviewed by Denis Goode PA-C.     Medical Decision Making  Positive Rhinovirus. Negative Flu A, Flu B, RSV, Rhinovirus. Advised pt to take dayquil and Nyquil as needed for symptoms.   -         Patient is educated about their diagnoses.     -          Discussed medications benefits and adverse effects.     -          Answered all patient’s questions.     -          Patient will call 911 or go to the nearest ED if worsen symptoms .     -          Patient is agreeable to the plan of care and is deemed stable upon discharge.     -          Follow up with your primary care provider in two days.      Orders and Diagnoses  Diagnoses and all orders for this visit:  Rhinovirus infection  Sore throat  -     POCT SPOTFIRE R/ST Panel Mini w/Strep A ([facility]) manually  resulted      Medical Admin Record      Patient disposition: Home    Electronically signed by Denis Goode PA-C  8:49 AM

## 2025-04-14 NOTE — PROGRESS NOTES
"AUDIOLOGY ADULT AUDIOMETRIC EVALUATION      Name:  Antonio Collins   :  1955  Age:  69 y.o.  Date of Evaluation:  4/15/2025    Time: ***-***    IMPRESSIONS     {Adult Impressions:74804}  {impressions wrs:98916}  {oaes (Optional):58610}  {tymps:36806}     Today's test results are {AUD IMPRESSIONS:76346}.     Amplification needs: {Amplification Needs:38268}    RECOMMENDATIONS     {Adult Recommendations:59765}    HISTORY     Reason for visit:  Mr. Collins is seen today for a follow-up audiologic evaluation due to known hearing loss. His PCP is Fly Loera MD. Previous audio was performed on 24 and revealed hearing levels within normal limits 125-2000 Hz falling to a mild to moderate sensorineural hearing loss 2283-1917 Hz in the right ear and hearing levels within normal limits 125-1000 Hz falling to a mild sloping to moderate sensorineural hearing loss 5955-3063 Hz in the left ear. History obtained from patient report and chart review.     Change in Hearing: {duncan y/n:64148}  Difficult listening environments: ***  Tinnitus: {duncan y/n:23239} {duncan tinnitus (Optional):23004}  Otalgia: {duncan y/n:52165::\"denied\"}  Aural Pressure/Fullness: {duncan y/n:41242::\"denied\"}  Recent Ear Infections/Illness: {duncan y/n:82601::\"denied\"}  Otorrhea: {duncan y/n:46217::\"denied\"}  Dizziness: {duncan y/n dizzy:95248::\"denied\"}  History of Ear Surgeries: {y/n surgeries:30185::\"denied\"}  History of Noise Exposure: {duncan noise exposure:90506::\"Denied\"}  Family History of Hearing Loss: {duncan fam hx hl:12220::\"Denied\"}  Hearing Aid Use: {duncan HA use:55937::\"none\"}  Other Significant History: {duncan y/n:65523::\"denied\"}  Falls within the last year: {duncan y/n falls:73776::\"denied\"}    EVALUATION     See scanned audiogram in \"Media\"     TEST RESULTS     Otoscopic Evaluation:  Right Ear: {otoscopy:05831}  Left Ear: {otoscopy:05710}    Tympanometry (226 Hz):  Right Ear: {tymp results:00241}.   Left Ear: {tymp results:04403}.     Acoustic Reflexes:   Right Ear: " "{duncan ART:47886}  Left Ear: {duncan ART:17569}    Distortion Product Otoacoustic Emissions:  Right Ear: {duncan DPOAEs:33852::\"Did not test.\"}  Left Ear:  {duncan DPOAEs:08167::\"Did not test.\"}  Present OAEs suggest normal or near cochlear outer hair cell function for corresponding frequency region(s). Absent OAEs with normal middle ear function can be consistent with some degree of hearing loss. Assessment of cochlear outer hair cell function may be impacted by outer or middle ear function.    Test technique:  Pure Tone Audiometry via {transducer:00398}  Reliability: {kmreliability:96993}    Pure Tone Audiometry:    Right Ear: {results:77690}  Left Ear: {results:58549}    Speech Audiometry:   Right Ear:  Speech Reception Threshold (SRT) was obtained at *** dB HL. Word Recognition scores were {DESC; EXCELLENT/GOOD/FAIR:57499} (***%) in quiet when words were presented at *** dB HL. These results are based on {Word List:88440}.   Left Ear:  Speech Reception Threshold (SRT) was obtained at *** dB HL. Word Recognition scores were {DESC; EXCELLENT/GOOD/FAIR:11442} (***%) in quiet when words were presented at *** dB HL. These results are based on {Word List:78510}.     Comparison of today's results with previous test results: *** since last evaluation on 4/2/24.    ***     PATIENT EDUCATION     Discussed results and recommendations with Mr. Collins{duncan results discussion (Optional):77940}. Questions were addressed and the patient was encouraged to contact our department at [phone] should concerns arise.       Angelika Engle, Christopher, CCC-A  Senior Clinical Audiologist     Degree of   Hearing Sensitivity dB Range   Within Normal Limits (WNL) 0 - 20   Slight 25   Mild 26 - 40   Moderate 41 - 55   Moderately-Severe 56 - 70   Severe 71 - 90   Profound 91 +     Key   CHL Conductive Hearing Loss   ECV Ear Canal Volume   HA Hearing Aid   NIHL Noise-Induced Hearing Loss   PTA Pure Tone Average   SNHL Sensorineural Hearing Loss   TM " Tympanic Membrane   WNL Within Normal Limits

## 2025-04-15 ENCOUNTER — APPOINTMENT (OUTPATIENT)
Dept: AUDIOLOGY | Facility: CLINIC | Age: 70
End: 2025-04-15
Payer: COMMERCIAL

## 2025-04-23 ENCOUNTER — APPOINTMENT (OUTPATIENT)
Dept: AUDIOLOGY | Facility: CLINIC | Age: 70
End: 2025-04-23
Payer: COMMERCIAL

## 2025-04-30 ENCOUNTER — APPOINTMENT (OUTPATIENT)
Dept: AUDIOLOGY | Facility: CLINIC | Age: 70
End: 2025-04-30
Payer: MEDICARE

## 2025-05-02 NOTE — PROGRESS NOTES
AUDIOLOGY ADULT AUDIOMETRIC EVALUATION      Name:  Antonio Collins   :  1955  Age:  69 y.o.  Date of Evaluation:  2025    Time: 2206-9739    IMPRESSIONS     Right Ear: Hearing sensitivity within normal limits 125-2000 Hz falling to a mild to moderate sensorineural hearing loss at 3234-7563 Hz.  Left Ear: Hearing sensitivity within normal limits 125-1000 Hz falling to a mild to moderate sensorineural hearing loss at 7910-3190 Hz.  Word recognition in quiet is excellent in both ears.  Tympanometry indicates normal middle ear pressure and tympanic membrane mobility in both ears.       RECOMMENDATIONS     Continue medical follow up with primary care provider and/or Ears Nose and Throat (ENT) provider as recommended.  Return for audiologic evaluation annually or sooner should concerns arise. The audiology department can be reached at [phone] to schedule an appointment.   Avoid exposure to loud sounds by moving away from the noise, turning down the volume, or wearing proper hearing protection correctly.    HISTORY     Reason for visit:  Mr. Collins is seen today for a follow-up audiologic evaluation due to known hearing loss. His PCP is Fly Loera MD. Previous audio was performed on 24 and revealed hearing levels within normal limits 125-2000 Hz falling to a mild to moderate sensorineural hearing loss 3033-8112 Hz in the right ear and hearing levels within normal limits 125-1000 Hz falling to a mild sloping to moderate sensorineural hearing loss 7393-0766 Hz in the left ear. History obtained from patient report and chart review.     Change in Hearing: no  Tinnitus: yes, occasionally hears the humming of the electric in his home  Otalgia: no  Aural Pressure/Fullness: no  Recent Ear Infections/Illness: congestion  History of Ear Surgeries: no  Family History of Hearing Loss: mom, with aging, father after , older sister uses hearing aids  Hearing Aid Use: None    EVALUATION     See  "scanned audiogram in \"Media\"     TEST RESULTS     Otoscopic Evaluation:  Right Ear: Non-occluding cerumen, tympanic membrane visualized.  Left Ear: Non-occluding cerumen, tympanic membrane visualized.    Tympanometry (226 Hz):  Right Ear: Type A, middle ear pressure and tympanic membrane compliance within normal limits.   Left Ear: Type A, middle ear pressure and tympanic membrane compliance within normal limits.     Acoustic Reflexes:   Right Ear: (Ipsilateral) Responses present at 500-2000 Hz, and absent at 4000 Hz.  Left Ear: (Ipsilateral) Responses present at 500-4000 Hz.    Distortion Product Otoacoustic Emissions:  Right Ear: Did not test.  Left Ear:  Did not test.  Present OAEs suggest normal or near cochlear outer hair cell function for corresponding frequency region(s). Absent OAEs with normal middle ear function can be consistent with some degree of hearing loss. Assessment of cochlear outer hair cell function may be impacted by outer or middle ear function.    Test technique:  Pure Tone Audiometry via insert earphones  Reliability: Good    Pure Tone Audiometry:    Right Ear: Hearing sensitivity within normal limits 125-2000 Hz falling to a mild to moderate sensorineural hearing loss at 4847-2282 Hz.  Left Ear: Hearing sensitivity within normal limits 125-1000 Hz falling to a mild to moderate sensorineural hearing loss at 7733-2023 Hz.    Speech Audiometry:   Right Ear:  Speech Reception Threshold (SRT) was obtained at 15 dB HL. Word Recognition scores were excellent (96%) in quiet when words were presented at 55 dB HL. These results are based on [facility] Auditory Test No.6 (NU-6) (N=25).   Left Ear:  Speech Reception Threshold (SRT) was obtained at 15 dB HL. Word Recognition scores were excellent (92%) in quiet when words were presented at 55 dB HL. These results are based on [facility] Auditory Test No.6 (NU-6) (N=25).     Comparison of today's results with previous test " results: Stable since last evaluation on 4/2/24.         PATIENT EDUCATION     Discussed results and recommendations with Mr. Collins. Questions were addressed and the patient was encouraged to contact our department at [phone] should concerns arise.       Christopher Casper, CCC-A  Senior Clinical Audiologist     Degree of   Hearing Sensitivity dB Range   Within Normal Limits (WNL) 0 - 20   Slight 25   Mild 26 - 40   Moderate 41 - 55   Moderately-Severe 56 - 70   Severe 71 - 90   Profound 91 +     Key   CHL Conductive Hearing Loss   ECV Ear Canal Volume   HA Hearing Aid   NIHL Noise-Induced Hearing Loss   PTA Pure Tone Average   SNHL Sensorineural Hearing Loss   TM Tympanic Membrane   WNL Within Normal Limits

## 2025-05-06 ENCOUNTER — APPOINTMENT (OUTPATIENT)
Dept: AUDIOLOGY | Facility: CLINIC | Age: 70
End: 2025-05-06
Payer: MEDICARE

## 2025-05-06 DIAGNOSIS — H90.3 SENSORINEURAL HEARING LOSS (SNHL) OF BOTH EARS: Primary | ICD-10-CM

## 2025-05-06 PROCEDURE — 92557 COMPREHENSIVE HEARING TEST: CPT | Performed by: AUDIOLOGIST

## 2025-05-06 PROCEDURE — 92550 TYMPANOMETRY & REFLEX THRESH: CPT | Mod: 52 | Performed by: AUDIOLOGIST

## 2025-06-25 ENCOUNTER — APPOINTMENT (OUTPATIENT)
Dept: VASCULAR SURGERY | Facility: CLINIC | Age: 70
End: 2025-06-25
Payer: COMMERCIAL

## 2025-06-25 VITALS
HEART RATE: 52 BPM | HEIGHT: 72 IN | WEIGHT: 199 LBS | BODY MASS INDEX: 26.95 KG/M2 | DIASTOLIC BLOOD PRESSURE: 69 MMHG | SYSTOLIC BLOOD PRESSURE: 109 MMHG

## 2025-06-25 DIAGNOSIS — R60.0 LEG EDEMA, LEFT: Primary | ICD-10-CM

## 2025-06-25 DIAGNOSIS — I89.0 LYMPHEDEMA: ICD-10-CM

## 2025-06-25 PROCEDURE — 1036F TOBACCO NON-USER: CPT | Performed by: NURSE PRACTITIONER

## 2025-06-25 PROCEDURE — 3008F BODY MASS INDEX DOCD: CPT | Performed by: NURSE PRACTITIONER

## 2025-06-25 PROCEDURE — 99214 OFFICE O/P EST MOD 30 MIN: CPT | Performed by: NURSE PRACTITIONER

## 2025-06-25 PROCEDURE — 1159F MED LIST DOCD IN RCRD: CPT | Performed by: NURSE PRACTITIONER

## 2025-06-25 RX ORDER — FLUOCINONIDE 1 MG/G
CREAM TOPICAL
COMMUNITY
Start: 2025-03-18

## 2025-06-25 RX ORDER — DESONIDE 0.5 MG/G
CREAM TOPICAL
COMMUNITY
Start: 2025-03-13

## 2025-06-25 ASSESSMENT — ENCOUNTER SYMPTOMS
HEMATOLOGIC/LYMPHATIC NEGATIVE: 1
DEPRESSION: 0
ALLERGIC/IMMUNOLOGIC NEGATIVE: 1
PSYCHIATRIC NEGATIVE: 1
EYES NEGATIVE: 1
ENDOCRINE NEGATIVE: 1
UNEXPECTED WEIGHT CHANGE: 0
APPETITE CHANGE: 0
PALPITATIONS: 0
OCCASIONAL FEELINGS OF UNSTEADINESS: 0
CHEST TIGHTNESS: 0
SHORTNESS OF BREATH: 0
NEUROLOGICAL NEGATIVE: 1
ACTIVITY CHANGE: 0
MUSCULOSKELETAL NEGATIVE: 1
LOSS OF SENSATION IN FEET: 0
GASTROINTESTINAL NEGATIVE: 1

## 2025-06-25 ASSESSMENT — PATIENT HEALTH QUESTIONNAIRE - PHQ9
2. FEELING DOWN, DEPRESSED OR HOPELESS: NOT AT ALL
1. LITTLE INTEREST OR PLEASURE IN DOING THINGS: NOT AT ALL
SUM OF ALL RESPONSES TO PHQ9 QUESTIONS 1 AND 2: 0

## 2025-06-25 ASSESSMENT — COLUMBIA-SUICIDE SEVERITY RATING SCALE - C-SSRS
1. IN THE PAST MONTH, HAVE YOU WISHED YOU WERE DEAD OR WISHED YOU COULD GO TO SLEEP AND NOT WAKE UP?: NO
2. HAVE YOU ACTUALLY HAD ANY THOUGHTS OF KILLING YOURSELF?: NO
6. HAVE YOU EVER DONE ANYTHING, STARTED TO DO ANYTHING, OR PREPARED TO DO ANYTHING TO END YOUR LIFE?: NO

## 2025-06-25 NOTE — PATIENT INSTRUCTIONS
It was a pleasure taking care of you today and appreciate your seeing us at our [facility].     Today's plan is as follows:  1) Elevate your legs at rest  2) Wear 30-40mmHg compression stockings, on during the day when standing, off while sleeping. These are medically necessary for lymphedema  3) Follow up in 4-6 months, sooner as needed    Please call the office with any questions at [phone].   You can speak to our secretaries or our clinical nurses for specific questions.   For [facility] specific questions, you can also call [phone] or email at [email or web address]  If you need coordinating your appointments and testing you can do these at the  or by calling my office shortly after your visit.

## 2025-06-25 NOTE — PROGRESS NOTES
F/U REASON: left leg/ankle edema    HPI:  Antonio Collins is 70 y.o. male here for follow up of left ankle edema. He has been wearing the 20-30 mmHg compression stockings daily with some improvement. He remains very active.     Meds:   Current Medications[1]    Allergies:   RX Allergies[2]    ROS:  Review of Systems   Constitutional:  Negative for activity change, appetite change and unexpected weight change.   HENT: Negative.     Eyes: Negative.    Respiratory:  Negative for chest tightness and shortness of breath.    Cardiovascular:  Positive for leg swelling. Negative for chest pain and palpitations.   Gastrointestinal: Negative.    Endocrine: Negative.    Genitourinary: Negative.    Musculoskeletal: Negative.    Skin: Negative.    Allergic/Immunologic: Negative.    Neurological: Negative.    Hematological: Negative.    Psychiatric/Behavioral: Negative.       otherwise unremarkable    Objective:  Vitals:  /69 (BP Location: Right arm)   Pulse 52   Ht 1.829 m (6')   Wt 90.3 kg (199 lb)   BMI 26.99 kg/m²     Exam:  Physical Exam  Vitals reviewed.   HENT:      Head: Normocephalic and atraumatic.      Nose: Nose normal.      Mouth/Throat:      Mouth: Mucous membranes are moist.   Eyes:      Conjunctiva/sclera: Conjunctivae normal.   Cardiovascular:      Rate and Rhythm: Normal rate.      Pulses: Normal pulses.   Pulmonary:      Effort: Pulmonary effort is normal.   Musculoskeletal:         General: Normal range of motion.      Cervical back: Normal range of motion.      Left lower leg: Edema present.   Skin:     General: Skin is warm and dry.      Capillary Refill: Capillary refill takes less than 2 seconds.      Comments: With small area of anterior/medial ankle reticular veins. Hyperpigmentation to LLE around ankle   Neurological:      General: No focal deficit present.      Mental Status: He is alert and oriented to person, place, and time.   Psychiatric:         Mood and Affect: Mood normal.          Behavior: Behavior normal.         Thought Content: Thought content normal.         Judgment: Judgment normal.     Labs:  Lab Results   Component Value Date    WBC 4.5 03/18/2025    WBC 4.6 04/06/2024    WBC 5.2 11/27/2023    HGB 14.4 03/18/2025    HGB 13.8 04/06/2024    HGB 13.0 (L) 11/27/2023    HCT 43.9 03/18/2025    HCT 42.0 04/06/2024    HCT 39.7 (L) 11/27/2023     03/18/2025     (H) 04/06/2024     (H) 11/27/2023     03/18/2025     Lab Results   Component Value Date    CREATININE 0.78 03/18/2025    CREATININE 0.81 04/06/2024    CREATININE 0.79 11/27/2023    BUN 16 03/18/2025    BUN 17 04/06/2024    BUN 16 11/27/2023     03/18/2025     04/06/2024     11/27/2023    K 4.3 03/18/2025    K 4.3 04/06/2024    K 4.1 11/27/2023     03/18/2025     04/06/2024     11/27/2023    CO2 29 03/18/2025    CO2 27 04/06/2024    CO2 28 11/27/2023       Imaging:                Assessment & Plan:  Antonio Collins is 70 y.o. male with history of actinic keratoses, vit D deficiency, colon polyps, glaucoma who is presents with Left ankle edema. He has been wearing the 20-30 mmHg compression stockings daily with some improvement. He remains very active.     Patient presents today with stage 2 lymphedema. Will begin (or continue) conservative treatments including elevation, exercise, and class 2 compression stockings.    Circumferential limb measurements:  Left ankle: 24.5cm  Left calf: 37cm    Right ankle: 22.5 cm   Right calf: 37 cm    It was a pleasure taking care of you today and appreciate your seeing us at our [facility].     Today's plan is as follows:  1) Elevate your legs at rest  2) Wear 30-40mmHg compression stockings, on during the day when standing, off while sleeping. These are medically necessary for lymphedema  3) Follow up in 4-6 months, sooner as needed    Olya Benites DNP, ALVIN-CNP, AGPCNP-C, FNP-C,  AGACNP-BC  Senior Nurse Practitioner, Vascular Surgery  [facility], [facility] [place]  [address], Office [phone]               [1]   Current Outpatient Medications:     cholecalciferol (Vitamin D3) 25 MCG (1000 UT) capsule, Take 1 capsule (25 mcg) by mouth once daily. (Take with food), Disp: , Rfl:     desonide (DesOwen) 0.05 % cream, APPLY TO FACE TWICE A DAY, Disp: , Rfl:     fluocinonide (Lidex) 0.05 % cream, Twice daily to affected areas on neck, chest, and back, Disp: 60 g, Rfl: 3    fluocinonide (LIDEX) 0.1 % cream, APPLY TWICE DAILY TO AFFECTED AREAS ON TRUNK., Disp: , Rfl:     latanoprost (Xalatan) 0.005 % ophthalmic solution, ADMINISTER 1 DROP INTO BOTH EYES ONCE DAILY AT BEDTIME., Disp: 7.5 mL, Rfl: 2    timolol (Timoptic) 0.5 % ophthalmic solution, Administer 1 drop into both eyes once daily. For open angle with borderline findings and low glaucoma risk in both eyes, Disp: 10 mL, Rfl: 11  [2] No Known Allergies

## 2025-07-03 ENCOUNTER — APPOINTMENT (OUTPATIENT)
Dept: OPHTHALMOLOGY | Facility: CLINIC | Age: 70
End: 2025-07-03
Payer: MEDICARE

## 2025-07-03 DIAGNOSIS — H40.1131 PRIMARY OPEN ANGLE GLAUCOMA OF BOTH EYES, MILD STAGE: Primary | ICD-10-CM

## 2025-07-03 DIAGNOSIS — H25.813 COMBINED FORMS OF AGE-RELATED CATARACT OF BOTH EYES: ICD-10-CM

## 2025-07-03 DIAGNOSIS — H40.013 OPEN ANGLE WITH BORDERLINE FINDINGS, LOW RISK, BILATERAL: ICD-10-CM

## 2025-07-03 PROCEDURE — 92133 CPTRZD OPH DX IMG PST SGM ON: CPT | Performed by: OPHTHALMOLOGY

## 2025-07-03 PROCEDURE — 92083 EXTENDED VISUAL FIELD XM: CPT | Performed by: OPHTHALMOLOGY

## 2025-07-03 PROCEDURE — 99214 OFFICE O/P EST MOD 30 MIN: CPT | Performed by: OPHTHALMOLOGY

## 2025-07-03 PROCEDURE — 92020 GONIOSCOPY: CPT | Performed by: OPHTHALMOLOGY

## 2025-07-03 RX ORDER — TIMOLOL MALEATE 5 MG/ML
1 SOLUTION/ DROPS OPHTHALMIC DAILY
Qty: 30 ML | Refills: 3 | Status: SHIPPED | OUTPATIENT
Start: 2025-07-03

## 2025-07-03 RX ORDER — LATANOPROST 50 UG/ML
1 SOLUTION/ DROPS OPHTHALMIC NIGHTLY
Qty: 7.5 ML | Refills: 3 | Status: SHIPPED | OUTPATIENT
Start: 2025-07-03

## 2025-07-03 ASSESSMENT — EXTERNAL EXAM - RIGHT EYE: OD_EXAM: NORMAL

## 2025-07-03 ASSESSMENT — GONIOSCOPY
OS_SUPERIOR: D25R 3+ PTM
OS_TEMPORAL: D25R 3+ PTM
OD_INFERIOR: D25R 4+ PTM
OD_TEMPORAL: D25R 4+ PTM
OD_SUPERIOR: D25R 4+ PTM
OS_NASAL: D25R 3+ PTM
OS_INFERIOR: D25R 3+ PTM
OD_NASAL: D25R 4+ PTM

## 2025-07-03 ASSESSMENT — TONOMETRY
IOP_METHOD: GOLDMANN APPLANATION
OS_IOP_MMHG: 11
OD_IOP_MMHG: 11

## 2025-07-03 ASSESSMENT — ENCOUNTER SYMPTOMS
MUSCULOSKELETAL NEGATIVE: 0
EYES NEGATIVE: 1
GASTROINTESTINAL NEGATIVE: 0
ENDOCRINE NEGATIVE: 0
PSYCHIATRIC NEGATIVE: 0
HEMATOLOGIC/LYMPHATIC NEGATIVE: 0
CARDIOVASCULAR NEGATIVE: 0
NEUROLOGICAL NEGATIVE: 0
CONSTITUTIONAL NEGATIVE: 0
ALLERGIC/IMMUNOLOGIC NEGATIVE: 0
RESPIRATORY NEGATIVE: 0

## 2025-07-03 ASSESSMENT — VISUAL ACUITY
OS_CC: 20/200
OD_CC: 20/25
CORRECTION_TYPE: CONTACTS
OS_PH_CC: 20/30-2
METHOD: SNELLEN - LINEAR

## 2025-07-03 ASSESSMENT — SLIT LAMP EXAM - LIDS
COMMENTS: GOOD POSITION
COMMENTS: GOOD POSITION

## 2025-07-03 ASSESSMENT — CONF VISUAL FIELD
OD_SUPERIOR_TEMPORAL_RESTRICTION: 0
OD_SUPERIOR_NASAL_RESTRICTION: 0
OS_SUPERIOR_NASAL_RESTRICTION: 0
OS_INFERIOR_NASAL_RESTRICTION: 0
OD_INFERIOR_TEMPORAL_RESTRICTION: 0
OS_INFERIOR_TEMPORAL_RESTRICTION: 0
OD_INFERIOR_NASAL_RESTRICTION: 0
OS_SUPERIOR_TEMPORAL_RESTRICTION: 0
OS_NORMAL: 1
OD_NORMAL: 1

## 2025-07-03 ASSESSMENT — CUP TO DISC RATIO
OD_RATIO: 0.5
OS_RATIO: 0.5

## 2025-07-03 ASSESSMENT — EXTERNAL EXAM - LEFT EYE: OS_EXAM: NORMAL

## 2025-07-03 ASSESSMENT — PACHYMETRY
OS_CT(UM): 572
OD_CT(UM): 575

## 2025-07-03 NOTE — PROGRESS NOTES
"Visual Acuity (Snellen - Linear)         Right Left    Dist cc 20/25 20/200    Dist ph cc  20/30-2      Correction: Contacts          Tonometry       Tonometry (Goldmann Applanation, 8:55 AM)         Right Left    Pressure 11 11                  Assessment/Plan   Last dilated:  7/3/25  Last gonio 7/3/25 OD  D25r 4+ PTM    OS:  D25r 3-4+ PTM    1.  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  Abnl on RNFl and VF likely secondary to anomalous ON.  IOPs have gradually increased.  Pt with RNFL progression OS.  Now IOPs are very well controlled and without VF progression      Plan:  cont latanoprost OU QHS             cont timolol OU Qam             f/u 6 month     2.  PVD OU:  asymptomatic, but RDW warnings reviewed due to RNFL findings      Plan:  monitor    3.  Cataracts OU:  mildly visually significant      Plan:  monitor   "

## 2025-08-06 DIAGNOSIS — T75.3XXA MOTION SICKNESS, INITIAL ENCOUNTER: ICD-10-CM

## 2025-08-06 RX ORDER — SCOPOLAMINE 1 MG/3D
1 PATCH, EXTENDED RELEASE TRANSDERMAL
Qty: 14 PATCH | Refills: 1 | Status: SHIPPED | OUTPATIENT
Start: 2025-08-06 | End: 2025-10-29

## 2025-08-06 NOTE — TELEPHONE ENCOUNTER
Antonio & Mahsa are going on 2 trips and they're requesting Scopolamine patches.  Pended.  Please sign.  Thank you

## 2025-08-07 NOTE — PROGRESS NOTES
FUV    Last seen - 8/8/24     HISTORY OF PRESENT ILLNESS:   Antonio Collins is a 70 y.o. male who is being seen today for FUV with POCT UA results    FHx of prostate cancer with father and 3 uncles all dx with prostate cancer. He denies any hx of breast cancer in family.    PAST MEDICAL HISTORY:  Past Medical History:   Diagnosis Date    Benign prostatic hyperplasia 2023    Cataract 2023    Elevated PSA February 2023    Glaucoma 2019    Other specified health status 05/13/2019    No pertinent past medical history    Polyp of vocal cord and larynx     Polyp, vocal cord   - Elevated PSA    PAST SURGICAL HISTORY:  Past Surgical History:   Procedure Laterality Date    CYSTOSCOPY  1/18/2024    OTHER SURGICAL HISTORY  2003    Vocal cord nodule resection x2 in 2003/2004    OTHER SURGICAL HISTORY      No surgery, traction in hospital x 7 weeks, age 9    OTHER SURGICAL HISTORY  1993    Varicocelectomy    PROSTATE SURGERY      VARICOCELE EXCISION  1993    VASECTOMY  2000    Late 1990s        ALLERGIES:   No Known Allergies     MEDICATIONS:   Current Outpatient Medications   Medication Instructions    cholecalciferol (VITAMIN D3) 25 mcg, oral, Daily, (Take with food)    desonide (DesOwen) 0.05 % cream APPLY TO FACE TWICE A DAY    fluocinonide (Lidex) 0.05 % cream Twice daily to affected areas on neck, chest, and back    fluocinonide (LIDEX) 0.1 % cream APPLY TWICE DAILY TO AFFECTED AREAS ON TRUNK.    latanoprost (Xalatan) 0.005 % ophthalmic solution 1 drop, Both Eyes, Nightly    scopolamine (Transderm-Scop) 1 mg over 3 days patch 3 day 1 patch, transdermal, Every 72 hours PRN    timolol (Timoptic) 0.5 % ophthalmic solution 1 drop, Both Eyes, Daily, For open angle with borderline findings and low glaucoma risk in both eyes        PHYSICAL EXAM:  There were no vitals taken for this visit.  Constitutional: Patient appears well-developed and well-nourished. No distress.    Pulmonary/Chest: Effort normal. No respiratory distress.  "  Abdominal: Soft, ND NT  : WNL  Musculoskeletal: Normal range of motion.    Neurological: Alert and oriented to person, place, and time.  Psychiatric: Normal mood and affect. Behavior is normal. Thought content normal.      Labs:  No results found for: \"TESTOSTERONE\"  Lab Results   Component Value Date    PSA 2.1 05/09/2023   Most recent PSA was 3.02 on 3/18/25    No components found for: \"CBC\"  Lab Results   Component Value Date    CREATININE 0.78 03/18/2025     No components found for: \"TESTOTMS\"  No results found for: \"TESTF\"  Imagining:   - CT Urogram on 1/5/24 resulted negative.  - Results reviewed with patient. Patient reassured.     AUA 1  LORENE:23    Discussion:  Doing well, no complaints. Denies any dysuria, hematuria, bothersome urinary frequency, urgency, or flank pain. Patient denies any pushing or straining to urinate. No complaints of ED. Will get a PSA in 6 and 12 months with a fuv in a year.   Assessment:      No diagnosis found.      Antonio Collins is a 70 y.o. male here for FUV     Plan:   Get a PSA in 6 and 12 months with a FUV in 12 months.   All questions and concerns were addressed. Patient verbalizes understanding and has no other questions at this time.     Scribe Attestation  By signing my name below, IRekha, Scribe   attest that this documentation has been prepared under the direction and in the presence of Sal Pan MD.   "

## 2025-08-08 ENCOUNTER — OFFICE VISIT (OUTPATIENT)
Dept: UROLOGY | Facility: HOSPITAL | Age: 70
End: 2025-08-08
Payer: MEDICARE

## 2025-08-08 DIAGNOSIS — Z12.5 SCREENING FOR PROSTATE CANCER: ICD-10-CM

## 2025-08-08 PROCEDURE — 99214 OFFICE O/P EST MOD 30 MIN: CPT | Performed by: UROLOGY

## 2025-08-08 PROCEDURE — 99212 OFFICE O/P EST SF 10 MIN: CPT

## 2025-08-08 PROCEDURE — G2211 COMPLEX E/M VISIT ADD ON: HCPCS | Performed by: UROLOGY

## 2025-08-08 PROCEDURE — 1159F MED LIST DOCD IN RCRD: CPT | Performed by: UROLOGY

## 2025-08-13 DIAGNOSIS — H40.013 OPEN ANGLE WITH BORDERLINE FINDINGS, LOW RISK, BILATERAL: ICD-10-CM

## 2025-08-13 RX ORDER — LATANOPROST 50 UG/ML
1 SOLUTION/ DROPS OPHTHALMIC NIGHTLY
Qty: 7.5 ML | Refills: 3 | Status: SHIPPED | OUTPATIENT
Start: 2025-08-13

## 2025-11-12 ENCOUNTER — APPOINTMENT (OUTPATIENT)
Dept: VASCULAR SURGERY | Facility: CLINIC | Age: 70
End: 2025-11-12
Payer: MEDICARE

## 2026-01-08 ENCOUNTER — APPOINTMENT (OUTPATIENT)
Dept: OPHTHALMOLOGY | Facility: CLINIC | Age: 71
End: 2026-01-08
Payer: MEDICARE

## 2026-03-26 ENCOUNTER — APPOINTMENT (OUTPATIENT)
Dept: PRIMARY CARE | Facility: CLINIC | Age: 71
End: 2026-03-26
Payer: MEDICARE